# Patient Record
Sex: FEMALE | Race: BLACK OR AFRICAN AMERICAN | Employment: FULL TIME | URBAN - METROPOLITAN AREA
[De-identification: names, ages, dates, MRNs, and addresses within clinical notes are randomized per-mention and may not be internally consistent; named-entity substitution may affect disease eponyms.]

---

## 2017-01-05 ENCOUNTER — LAB REQUISITION (OUTPATIENT)
Dept: LAB | Facility: HOSPITAL | Age: 47
End: 2017-01-05
Payer: COMMERCIAL

## 2017-01-05 DIAGNOSIS — Z11.51 ENCOUNTER FOR SCREENING FOR HUMAN PAPILLOMAVIRUS (HPV): ICD-10-CM

## 2017-01-05 DIAGNOSIS — Z01.419 ENCOUNTER FOR GYNECOLOGICAL EXAMINATION WITHOUT ABNORMAL FINDING: ICD-10-CM

## 2017-01-05 PROCEDURE — 87624 HPV HI-RISK TYP POOLED RSLT: CPT | Performed by: OBSTETRICS & GYNECOLOGY

## 2017-01-05 PROCEDURE — 88175 CYTOPATH C/V AUTO FLUID REDO: CPT | Performed by: OBSTETRICS & GYNECOLOGY

## 2017-01-06 LAB — HPV I/H RISK 1 DNA SPEC QL NAA+PROBE: NEGATIVE

## 2017-01-30 ENCOUNTER — OFFICE VISIT (OUTPATIENT)
Dept: FAMILY MEDICINE CLINIC | Facility: CLINIC | Age: 47
End: 2017-01-30

## 2017-01-30 VITALS
SYSTOLIC BLOOD PRESSURE: 130 MMHG | BODY MASS INDEX: 24.33 KG/M2 | TEMPERATURE: 98 F | WEIGHT: 155 LBS | HEART RATE: 89 BPM | HEIGHT: 67 IN | DIASTOLIC BLOOD PRESSURE: 82 MMHG | OXYGEN SATURATION: 98 %

## 2017-01-30 DIAGNOSIS — R68.89 FLU-LIKE SYMPTOMS: Primary | ICD-10-CM

## 2017-01-30 PROCEDURE — 99213 OFFICE O/P EST LOW 20 MIN: CPT | Performed by: FAMILY MEDICINE

## 2017-01-30 RX ORDER — CLINDAMYCIN HYDROCHLORIDE 300 MG/1
CAPSULE ORAL
Refills: 1 | COMMUNITY
Start: 2016-12-08 | End: 2017-01-30 | Stop reason: ALTCHOICE

## 2017-01-30 RX ORDER — CLINDAMYCIN HYDROCHLORIDE 150 MG/1
CAPSULE ORAL
Refills: 0 | COMMUNITY
Start: 2016-12-13 | End: 2016-12-18

## 2017-01-30 NOTE — PATIENT INSTRUCTIONS
Most viral illnesses get worse for the first 3-5 days, then plateau and improve gradually over the next 3-5 days. Monitor symptoms for now. Use otc meds for comfort as needed. Consider eucalytpus oil to chest and feet at bedtime.   If no better in 3-4

## 2017-01-31 NOTE — PROGRESS NOTES
Patient presents with:  Cough/URI: cold sx for 3 days. temp up to 102.1, now down to 99.        SUBJECTIVE:   Prasad Flores is a 48year old female who presents complaining of flu-like symptoms of fever to 102.1, malaise, fatigue, chills, myalgias, conges Heart Attack Maternal Grandmother      cause of death   • Heart Disorder Neg      sudden cardiac death   • Glaucoma Neg    • Diabetes Neg    • Macular degeneration Neg         Smoking Status: Never Smoker                      Smokeless Status: Never Used beyond 10 days consider starting antibiotics. patient verbalized understanding of the above and agrees to plan.

## 2017-02-22 ENCOUNTER — OFFICE VISIT (OUTPATIENT)
Dept: INTERNAL MEDICINE CLINIC | Facility: CLINIC | Age: 47
End: 2017-02-22

## 2017-02-22 VITALS
HEART RATE: 84 BPM | DIASTOLIC BLOOD PRESSURE: 90 MMHG | BODY MASS INDEX: 24.64 KG/M2 | WEIGHT: 157 LBS | SYSTOLIC BLOOD PRESSURE: 120 MMHG | OXYGEN SATURATION: 99 % | HEIGHT: 67 IN | TEMPERATURE: 98 F

## 2017-02-22 DIAGNOSIS — Z00.00 ANNUAL PHYSICAL EXAM: ICD-10-CM

## 2017-02-22 DIAGNOSIS — D35.2 PITUITARY ADENOMA (HCC): ICD-10-CM

## 2017-02-22 DIAGNOSIS — H54.7 VISION LOSS: Primary | ICD-10-CM

## 2017-02-22 DIAGNOSIS — R09.81 NASAL CONGESTION: ICD-10-CM

## 2017-02-22 PROCEDURE — 99396 PREV VISIT EST AGE 40-64: CPT | Performed by: INTERNAL MEDICINE

## 2017-02-22 RX ORDER — FLUTICASONE PROPIONATE 50 MCG
2 SPRAY, SUSPENSION (ML) NASAL DAILY
Qty: 3 BOTTLE | Refills: 3 | Status: SHIPPED | OUTPATIENT
Start: 2017-02-22 | End: 2017-09-25
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
## 2017-02-27 ENCOUNTER — TELEPHONE (OUTPATIENT)
Dept: OPHTHALMOLOGY | Facility: CLINIC | Age: 47
End: 2017-02-27

## 2017-02-27 NOTE — TELEPHONE ENCOUNTER
Patient has history of Pituitary tumor and has not been seen by Dr. Forrest Dela Cruz since October 2014. Scheduled patient for 3/7/17 at 7:30 for a 30-2 visual field, OCT and dilated EE.kp  Her neurologist wants her to follow up asap.

## 2017-02-27 NOTE — TELEPHONE ENCOUNTER
Patient would like to be seen sooner than next available due to vision problems. Please advise.  Thank you

## 2017-03-04 ENCOUNTER — HOSPITAL ENCOUNTER (OUTPATIENT)
Dept: MAMMOGRAPHY | Age: 47
Discharge: HOME | End: 2017-03-04
Attending: OBSTETRICS & GYNECOLOGY
Payer: COMMERCIAL

## 2017-03-04 DIAGNOSIS — Z12.31 ENCOUNTER FOR SCREENING MAMMOGRAM FOR BREAST CANCER: ICD-10-CM

## 2017-03-04 PROCEDURE — 77067 SCR MAMMO BI INCL CAD: CPT

## 2017-03-04 PROCEDURE — 77063 BREAST TOMOSYNTHESIS BI: CPT

## 2017-03-07 ENCOUNTER — LAB REQUISITION (OUTPATIENT)
Dept: LAB | Facility: HOSPITAL | Age: 47
End: 2017-03-07
Payer: COMMERCIAL

## 2017-03-07 ENCOUNTER — OFFICE VISIT (OUTPATIENT)
Dept: OPHTHALMOLOGY | Facility: CLINIC | Age: 47
End: 2017-03-07

## 2017-03-07 DIAGNOSIS — D35.2 PITUITARY ADENOMA (HCC): ICD-10-CM

## 2017-03-07 DIAGNOSIS — H46.9 OPTIC NEUROPATHY, LEFT: ICD-10-CM

## 2017-03-07 DIAGNOSIS — N39.0 URINARY TRACT INFECTION: ICD-10-CM

## 2017-03-07 DIAGNOSIS — H40.003 GLAUCOMA SUSPECT, BILATERAL: Primary | ICD-10-CM

## 2017-03-07 PROCEDURE — 92250 FUNDUS PHOTOGRAPHY W/I&R: CPT | Performed by: OPHTHALMOLOGY

## 2017-03-07 PROCEDURE — 87086 URINE CULTURE/COLONY COUNT: CPT | Performed by: OBSTETRICS & GYNECOLOGY

## 2017-03-07 PROCEDURE — 92083 EXTENDED VISUAL FIELD XM: CPT | Performed by: OPHTHALMOLOGY

## 2017-03-07 PROCEDURE — 87077 CULTURE AEROBIC IDENTIFY: CPT | Performed by: OBSTETRICS & GYNECOLOGY

## 2017-03-07 PROCEDURE — 92133 CPTRZD OPH DX IMG PST SGM ON: CPT | Performed by: OPHTHALMOLOGY

## 2017-03-07 PROCEDURE — 87186 SC STD MICRODIL/AGAR DIL: CPT | Performed by: OBSTETRICS & GYNECOLOGY

## 2017-03-07 PROCEDURE — 99244 OFF/OP CNSLTJ NEW/EST MOD 40: CPT | Performed by: OPHTHALMOLOGY

## 2017-03-07 PROCEDURE — 99213 OFFICE O/P EST LOW 20 MIN: CPT | Performed by: OPHTHALMOLOGY

## 2017-03-07 NOTE — PATIENT INSTRUCTIONS
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   Re

## 2017-03-07 NOTE — ASSESSMENT & PLAN NOTE
Patient is under the care of Dr. Moses Ross and Dr. Zuleika Sherman. She has not had an MRI since 2014. Recommend repeat MRI in the near future. Visual field completed with stable results that were discussed with patient in office.     Stressed importance of mansoor

## 2017-03-07 NOTE — ASSESSMENT & PLAN NOTE
Discussed with patient that she is a glaucoma suspect based on increased cupping of the optic nerve in the left eye. Retinal photos taken today to document optic nerves.    Visual field and OCT completed in office today with stable results that were discus

## 2017-03-07 NOTE — PROGRESS NOTES
Jitendra Lomeli is a 48year old female. HPI:     HPI     Consult    Additional comments: Patient is referred by Dr. Ivet Pandya   Patient is here for a VF 30-2, OCT and an eye exam.  She has a hx of a pituitary adenoma, dx in 2010.  She sees Allergies:    Amoxicillin             Hives  Penicillins             Hives    ROS:     ROS     Positive for: Neurological, Eyes    Negative for: Constitutional, Gastrointestinal, Skin, Genitourinary, Musculoskeletal, HENT, Endocrine, Cardiovascular, the optic nerve in the left eye. Retinal photos taken today to document optic nerves. Visual field and OCT completed in office today with stable results that were discussed with patient in office today.     Visual field in the right eye was normal and le

## 2017-03-09 ENCOUNTER — TELEPHONE (OUTPATIENT)
Dept: INTERNAL MEDICINE CLINIC | Facility: CLINIC | Age: 47
End: 2017-03-09

## 2017-03-09 DIAGNOSIS — M25.551 RIGHT HIP PAIN: ICD-10-CM

## 2017-03-09 DIAGNOSIS — Z96.642 HISTORY OF TOTAL HIP REPLACEMENT, LEFT: Primary | ICD-10-CM

## 2017-03-09 NOTE — TELEPHONE ENCOUNTER
LMOVM requesting call back for reason patient being seen. Has had THR with Dr. Von Singh in the past per Dr. Moore Ground note.  Need dx for new referral.

## 2017-03-10 NOTE — TELEPHONE ENCOUNTER
Pt states she is seeing Dr. Darrian Loya for one year f/u of L THR and also for new R hip pain that she has been having    To Dr. Analia Bull, ok to order referral?

## 2017-03-11 ENCOUNTER — HOSPITAL ENCOUNTER (OUTPATIENT)
Dept: MRI IMAGING | Facility: HOSPITAL | Age: 47
Discharge: HOME | End: 2017-03-11
Attending: INTERNAL MEDICINE
Payer: COMMERCIAL

## 2017-03-11 DIAGNOSIS — D35.3 BENIGN NEOPLASM OF PITUITARY GLAND AND CRANIOPHARYNGEAL DUCT (POUCH) (HCC): ICD-10-CM

## 2017-03-11 DIAGNOSIS — D35.2 BENIGN NEOPLASM OF PITUITARY GLAND AND CRANIOPHARYNGEAL DUCT (POUCH) (HCC): ICD-10-CM

## 2017-03-11 DIAGNOSIS — E22.1 FAMILIAL ISOLATED PROLACTIN SECRETING PITUITARY ADENOMA (HCC): ICD-10-CM

## 2017-03-11 DIAGNOSIS — D35.2 FAMILIAL ISOLATED PROLACTIN SECRETING PITUITARY ADENOMA (HCC): ICD-10-CM

## 2017-03-11 PROCEDURE — 70553 MRI BRAIN STEM W/O & W/DYE: CPT

## 2017-03-11 PROCEDURE — A9575 INJ GADOTERATE MEGLUMI 0.1ML: HCPCS | Performed by: INTERNAL MEDICINE

## 2017-03-13 ENCOUNTER — TELEPHONE (OUTPATIENT)
Dept: OPHTHALMOLOGY | Facility: CLINIC | Age: 47
End: 2017-03-13

## 2017-03-13 NOTE — TELEPHONE ENCOUNTER
pt called. LOV 3/7/17 with MICHELLE. She is asking for RX for glasses to be mail to her. Mailing address is verified. Thank You.

## 2017-03-20 ENCOUNTER — TELEPHONE (OUTPATIENT)
Dept: OPHTHALMOLOGY | Facility: CLINIC | Age: 47
End: 2017-03-20

## 2017-03-20 NOTE — TELEPHONE ENCOUNTER
Patient would like to double check and see if Rx mailed on 03/13 to home address is the current prescription. States it has a date of 2014 on it. Please advise.  Thank you

## 2017-03-21 ENCOUNTER — OPTICAL REFILL REQUEST (OUTPATIENT)
Dept: OPHTHALMOLOGY | Facility: CLINIC | Age: 47
End: 2017-03-21

## 2017-03-21 ENCOUNTER — TELEPHONE (OUTPATIENT)
Dept: OPHTHALMOLOGY | Facility: CLINIC | Age: 47
End: 2017-03-21

## 2017-03-21 NOTE — TELEPHONE ENCOUNTER
Spoke with patient. She states that the reading Rx she received was from 2014. I told patient that was the last refraction that she had.  I also told patient that I will reprint her reading glasses Rx with today's date and mail it to her/nw

## 2017-04-25 ENCOUNTER — TELEPHONE (OUTPATIENT)
Dept: INTERNAL MEDICINE CLINIC | Facility: CLINIC | Age: 47
End: 2017-04-25

## 2017-04-25 NOTE — TELEPHONE ENCOUNTER
[phone]  Both eyes are puffy underneath. Started this morning when she woke up.  No other symptoms  To clinical

## 2017-04-25 NOTE — TELEPHONE ENCOUNTER
Patient called back, states just this morning when she woke up she noticed the puffiness underneath both eyes, patient states that left is worse than right and she has never had this before.  Patient denies any allergies, states she was at [facility] over

## 2017-05-04 ENCOUNTER — TELEPHONE (OUTPATIENT)
Dept: INTERNAL MEDICINE CLINIC | Facility: CLINIC | Age: 47
End: 2017-05-04

## 2017-05-04 DIAGNOSIS — Z00.00 ANNUAL PHYSICAL EXAM: Primary | ICD-10-CM

## 2017-05-04 DIAGNOSIS — I10 ESSENTIAL HYPERTENSION: ICD-10-CM

## 2017-05-04 DIAGNOSIS — D35.2 PITUITARY ADENOMA (HCC): ICD-10-CM

## 2017-05-04 DIAGNOSIS — D35.2 PITUITARY ADENOMA (HCC): Primary | ICD-10-CM

## 2017-05-04 NOTE — TELEPHONE ENCOUNTER
Dr. Trisha Navarro notes in her 2/22/17 OV note that patient will be seeing Dr. Anay Rob for pituitary adenoma. Referral entered. Patient notified of auto-authorized referral for 3 visits. She verbalized understanding.

## 2017-05-04 NOTE — TELEPHONE ENCOUNTER
Please call pt, requesting order for labs  Pt hoping to go Saturday to [facility]  Please confirm that order in place at [phone]  Tasked to nursing

## 2017-05-04 NOTE — TELEPHONE ENCOUNTER
To Dr. Meme Carvajal. Please advise on which labs you would like. Active orders for Lipid Panel and CMP in system.

## 2017-05-06 ENCOUNTER — LAB ENCOUNTER (OUTPATIENT)
Dept: LAB | Facility: HOSPITAL | Age: 47
End: 2017-05-06
Attending: INTERNAL MEDICINE
Payer: COMMERCIAL

## 2017-05-06 DIAGNOSIS — I10 ESSENTIAL HYPERTENSION: ICD-10-CM

## 2017-05-06 DIAGNOSIS — D35.2 PITUITARY ADENOMA (HCC): ICD-10-CM

## 2017-05-06 PROCEDURE — 80053 COMPREHEN METABOLIC PANEL: CPT

## 2017-05-06 PROCEDURE — 84443 ASSAY THYROID STIM HORMONE: CPT

## 2017-05-06 PROCEDURE — 85025 COMPLETE CBC W/AUTO DIFF WBC: CPT

## 2017-05-06 PROCEDURE — 84146 ASSAY OF PROLACTIN: CPT

## 2017-05-06 PROCEDURE — 36415 COLL VENOUS BLD VENIPUNCTURE: CPT

## 2017-05-06 PROCEDURE — 80061 LIPID PANEL: CPT

## 2017-05-08 ENCOUNTER — TELEPHONE (OUTPATIENT)
Dept: INTERNAL MEDICINE CLINIC | Facility: CLINIC | Age: 47
End: 2017-05-08

## 2017-05-08 DIAGNOSIS — D69.6 THROMBOCYTOPENIA (HCC): Primary | ICD-10-CM

## 2017-05-08 NOTE — TELEPHONE ENCOUNTER
Please notify patient that her labs look good. 2 of her blood counts were very slightly low, and I would like her to repeat one blood test in 1 month. She does not need to fast for this, order is in the system.      I am forwarding these tests to Dr. Francisco Javier Gomez

## 2017-09-25 ENCOUNTER — OFFICE VISIT (OUTPATIENT)
Dept: INTERNAL MEDICINE CLINIC | Facility: CLINIC | Age: 47
End: 2017-09-25

## 2017-09-25 ENCOUNTER — LAB ENCOUNTER (OUTPATIENT)
Dept: LAB | Facility: HOSPITAL | Age: 47
End: 2017-09-25
Attending: INTERNAL MEDICINE
Payer: COMMERCIAL

## 2017-09-25 VITALS
HEIGHT: 67 IN | HEART RATE: 76 BPM | OXYGEN SATURATION: 99 % | WEIGHT: 158 LBS | BODY MASS INDEX: 24.8 KG/M2 | DIASTOLIC BLOOD PRESSURE: 92 MMHG | TEMPERATURE: 99 F | SYSTOLIC BLOOD PRESSURE: 132 MMHG

## 2017-09-25 DIAGNOSIS — H54.7 VISION LOSS: ICD-10-CM

## 2017-09-25 DIAGNOSIS — D69.6 THROMBOCYTOPENIA (HCC): ICD-10-CM

## 2017-09-25 DIAGNOSIS — IMO0002 ANTIBIOTIC PROPHYLAXIS FOR DENTAL PROCEDURE INDICATED DUE TO PRIOR JOINT REPLACEMENT: ICD-10-CM

## 2017-09-25 DIAGNOSIS — G44.229 CHRONIC TENSION-TYPE HEADACHE, NOT INTRACTABLE: ICD-10-CM

## 2017-09-25 DIAGNOSIS — B07.0 PLANTAR WART: Primary | ICD-10-CM

## 2017-09-25 LAB
BASOPHILS # BLD: 0 K/UL (ref 0–0.2)
BASOPHILS NFR BLD: 1 %
EOSINOPHIL # BLD: 0.2 K/UL (ref 0–0.7)
EOSINOPHIL NFR BLD: 3 %
ERYTHROCYTE [DISTWIDTH] IN BLOOD BY AUTOMATED COUNT: 13.1 % (ref 11–15)
HCT VFR BLD AUTO: 36.3 % (ref 35–48)
HGB BLD-MCNC: 12.2 G/DL (ref 12–16)
LYMPHOCYTES # BLD: 2 K/UL (ref 1–4)
LYMPHOCYTES NFR BLD: 34 %
MCH RBC QN AUTO: 32.7 PG (ref 27–32)
MCHC RBC AUTO-ENTMCNC: 33.5 G/DL (ref 32–37)
MCV RBC AUTO: 97.5 FL (ref 80–100)
MONOCYTES # BLD: 0.4 K/UL (ref 0–1)
MONOCYTES NFR BLD: 6 %
NEUTROPHILS # BLD AUTO: 3.4 K/UL (ref 1.8–7.7)
NEUTROPHILS NFR BLD: 57 %
PLATELET # BLD AUTO: 151 K/UL (ref 140–400)
PMV BLD AUTO: 9.3 FL (ref 7.4–10.3)
RBC # BLD AUTO: 3.73 M/UL (ref 3.7–5.4)
WBC # BLD AUTO: 6 K/UL (ref 4–11)

## 2017-09-25 PROCEDURE — 36415 COLL VENOUS BLD VENIPUNCTURE: CPT

## 2017-09-25 PROCEDURE — 85025 COMPLETE CBC W/AUTO DIFF WBC: CPT

## 2017-09-25 PROCEDURE — 99214 OFFICE O/P EST MOD 30 MIN: CPT | Performed by: INTERNAL MEDICINE

## 2017-09-25 PROCEDURE — 99212 OFFICE O/P EST SF 10 MIN: CPT | Performed by: INTERNAL MEDICINE

## 2017-09-25 RX ORDER — CLINDAMYCIN HYDROCHLORIDE 300 MG/1
CAPSULE ORAL
Qty: 2 CAPSULE | Refills: 0 | Status: SHIPPED | OUTPATIENT
Start: 2017-09-25 | End: 2018-01-15

## 2017-09-25 NOTE — PROGRESS NOTES
Opal Marino is a 50year old female. Patient presents with: Infection: States had a hang nail on right thumb for a few months now. Using peroxide and abx oint. Denies redness or drainage. Derm Problem: painful wart under right foot.   Follow - Up: Had SURGERY PROC UNLISTED      Comment: per NG: right ankle sx  2013: OOPHORECTOMY      Comment: per HANS: fallopians removed from uterine area  No date: PARATHYROIDECTOMY      Comment: 2010, [facility]  2012: PRIOR MYOMECTOMY   Family History   Problem Relation Age

## 2017-09-26 ENCOUNTER — TELEPHONE (OUTPATIENT)
Dept: INTERNAL MEDICINE CLINIC | Facility: CLINIC | Age: 47
End: 2017-09-26

## 2018-01-04 ENCOUNTER — OFFICE VISIT (OUTPATIENT)
Dept: OPHTHALMOLOGY | Facility: CLINIC | Age: 48
End: 2018-01-04

## 2018-01-04 ENCOUNTER — LAB ENCOUNTER (OUTPATIENT)
Dept: LAB | Facility: HOSPITAL | Age: 48
End: 2018-01-04
Attending: INTERNAL MEDICINE
Payer: COMMERCIAL

## 2018-01-04 DIAGNOSIS — H40.003 GLAUCOMA SUSPECT OF BOTH EYES: Primary | ICD-10-CM

## 2018-01-04 DIAGNOSIS — E22.1: Primary | ICD-10-CM

## 2018-01-04 DIAGNOSIS — H46.9 OPTIC NEUROPATHY: ICD-10-CM

## 2018-01-04 DIAGNOSIS — D35.2 PITUITARY ADENOMA (HCC): ICD-10-CM

## 2018-01-04 LAB — PROLACTIN SERPL-MCNC: 24.2 NG/ML (ref 1.4–24.2)

## 2018-01-04 PROCEDURE — 99243 OFF/OP CNSLTJ NEW/EST LOW 30: CPT | Performed by: OPHTHALMOLOGY

## 2018-01-04 PROCEDURE — 36415 COLL VENOUS BLD VENIPUNCTURE: CPT

## 2018-01-04 PROCEDURE — 99212 OFFICE O/P EST SF 10 MIN: CPT | Performed by: OPHTHALMOLOGY

## 2018-01-04 PROCEDURE — 84146 ASSAY OF PROLACTIN: CPT
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
## 2018-01-04 NOTE — PROGRESS NOTES
Chapis Christopher is a 50year old female. HPI:     HPI     Consult    Additional comments: Consult per Dr. Alana Mariee           Comments   Patient is here for a 1 year dilated exam.  She has a hx of a pituitary adenoma, dx in 2010. She sees Dr. Doron Zarco yearly. Prescriptions:  Clindamycin HCl 300 MG Oral Cap Take 2 tablets 30-60 minutes prior to dental procedure Disp: 2 capsule Rfl: 0   Cabergoline 0.5 MG Oral Tab Take 0.5 tablets by mouth once a week.  Disp:  Rfl: 1   Multiple Vitamins-Minerals ([place] Screen +0.00 000    Type:  Reading only          Manifest Refraction     Patient declines a refraction.  She is happy just wearing glasses for reading only                 ASSESSMENT/PLAN:     Diagnoses and Plan:     Glaucoma suspect  Discussed with patient that s

## 2018-01-15 ENCOUNTER — OFFICE VISIT (OUTPATIENT)
Dept: INTERNAL MEDICINE CLINIC | Facility: CLINIC | Age: 48
End: 2018-01-15

## 2018-01-15 VITALS
SYSTOLIC BLOOD PRESSURE: 112 MMHG | WEIGHT: 156 LBS | BODY MASS INDEX: 25.07 KG/M2 | HEIGHT: 66 IN | DIASTOLIC BLOOD PRESSURE: 78 MMHG | TEMPERATURE: 98 F | HEART RATE: 80 BPM

## 2018-01-15 DIAGNOSIS — R10.31 RIGHT GROIN PAIN: Primary | ICD-10-CM

## 2018-01-15 PROCEDURE — 99212 OFFICE O/P EST SF 10 MIN: CPT | Performed by: INTERNAL MEDICINE

## 2018-01-15 PROCEDURE — 99213 OFFICE O/P EST LOW 20 MIN: CPT | Performed by: INTERNAL MEDICINE

## 2018-01-15 NOTE — PROGRESS NOTES
Viraj Richey is a 50year old female. Patient presents with:  Back Pain: radiates from back to front on right side in kidney area      HPI:   Viraj Richey is a 50year old female who presents for: back pain    Last Sunday, walked in the house, tripped Comment: per NG: fallopians removed from uterine area  No date: PARATHYROIDECTOMY      Comment: 2010, [facility]  2012: PRIOR MYOMECTOMY   Family History   Problem Relation Age of Onset   • Heart Attack Maternal Grandmother 80     cause of death   • Cancer Fat

## 2018-04-03 ENCOUNTER — TELEPHONE (OUTPATIENT)
Dept: INTERNAL MEDICINE CLINIC | Facility: CLINIC | Age: 48
End: 2018-04-03

## 2018-04-03 DIAGNOSIS — M79.604 PAIN IN RIGHT LEG: ICD-10-CM

## 2018-04-03 DIAGNOSIS — Z98.890 HISTORY OF HIP SURGERY: Primary | ICD-10-CM

## 2018-04-03 NOTE — TELEPHONE ENCOUNTER
Pt requesting referral for Dr Sarah Anne, pt is scheduled 4/25,    Referral needed for follow up to hip surgery & pain in right leg, pt was told she will need 2 referrals.   Tasked to nursing

## 2018-04-04 NOTE — TELEPHONE ENCOUNTER
Called patient and notified her that referral was ordered. Notified her that status shows as \"open\" so managed care department will need to work with her insurance to get the referral approved. She verbalized understanding.

## 2018-04-18 ENCOUNTER — OFFICE VISIT (OUTPATIENT)
Dept: INTERNAL MEDICINE CLINIC | Facility: CLINIC | Age: 48
End: 2018-04-18

## 2018-04-18 ENCOUNTER — LAB ENCOUNTER (OUTPATIENT)
Dept: LAB | Age: 48
End: 2018-04-18
Attending: INTERNAL MEDICINE
Payer: COMMERCIAL

## 2018-04-18 VITALS
OXYGEN SATURATION: 98 % | BODY MASS INDEX: 24.62 KG/M2 | HEIGHT: 66 IN | SYSTOLIC BLOOD PRESSURE: 130 MMHG | DIASTOLIC BLOOD PRESSURE: 88 MMHG | HEART RATE: 142 BPM | TEMPERATURE: 102 F | WEIGHT: 153.19 LBS

## 2018-04-18 DIAGNOSIS — R68.89 RIGORS: ICD-10-CM

## 2018-04-18 DIAGNOSIS — B34.9 VIRAL SYNDROME: ICD-10-CM

## 2018-04-18 DIAGNOSIS — R50.9 FEVER, UNSPECIFIED FEVER CAUSE: Primary | ICD-10-CM

## 2018-04-18 DIAGNOSIS — R50.9 FEVER, UNSPECIFIED FEVER CAUSE: ICD-10-CM

## 2018-04-18 PROCEDURE — 85025 COMPLETE CBC W/AUTO DIFF WBC: CPT

## 2018-04-18 PROCEDURE — 87086 URINE CULTURE/COLONY COUNT: CPT

## 2018-04-18 PROCEDURE — 87040 BLOOD CULTURE FOR BACTERIA: CPT

## 2018-04-18 PROCEDURE — 36415 COLL VENOUS BLD VENIPUNCTURE: CPT

## 2018-04-18 PROCEDURE — 99212 OFFICE O/P EST SF 10 MIN: CPT | Performed by: INTERNAL MEDICINE

## 2018-04-18 PROCEDURE — 80053 COMPREHEN METABOLIC PANEL: CPT

## 2018-04-18 PROCEDURE — 87077 CULTURE AEROBIC IDENTIFY: CPT

## 2018-04-18 PROCEDURE — 99213 OFFICE O/P EST LOW 20 MIN: CPT | Performed by: INTERNAL MEDICINE

## 2018-04-18 PROCEDURE — 81001 URINALYSIS AUTO W/SCOPE: CPT

## 2018-04-18 PROCEDURE — 87186 SC STD MICRODIL/AGAR DIL: CPT

## 2018-04-18 RX ORDER — OSELTAMIVIR PHOSPHATE 75 MG/1
75 CAPSULE ORAL 2 TIMES DAILY
Qty: 10 CAPSULE | Refills: 0 | Status: ON HOLD | OUTPATIENT
Start: 2018-04-18 | End: 2018-04-20

## 2018-04-18 NOTE — PROGRESS NOTES
Shannan Larios is a 46year old female. Patient presents with:  Fever: 101 temp today. c/o chills, fever, N/V (x2), right lower back pain, lethargic. Denies any urinary sx. no cold of flu sx. just got over a stomach bug 2 weeks ago.     HPI:     Returned fr Encounters:  04/18/18 : 153 lb 3.2 oz (69.5 kg)  01/15/18 : 156 lb (70.8 kg)  09/25/17 : 158 lb (71.7 kg)  02/22/17 : 157 lb (71.2 kg)  01/30/17 : 155 lb (70.3 kg)    Body mass index is 24.73 kg/m².       EXAM:   /88 (BP Location: Left arm, Patient Po

## 2018-04-19 ENCOUNTER — ANESTHESIA (OUTPATIENT)
Dept: SURGERY | Facility: HOSPITAL | Age: 48
DRG: 872 | End: 2018-04-19
Payer: COMMERCIAL

## 2018-04-19 ENCOUNTER — SURGERY (OUTPATIENT)
Age: 48
End: 2018-04-19

## 2018-04-19 ENCOUNTER — APPOINTMENT (OUTPATIENT)
Dept: GENERAL RADIOLOGY | Facility: HOSPITAL | Age: 48
DRG: 872 | End: 2018-04-19
Attending: UROLOGY
Payer: COMMERCIAL

## 2018-04-19 ENCOUNTER — TELEPHONE (OUTPATIENT)
Dept: INTERNAL MEDICINE CLINIC | Facility: CLINIC | Age: 48
End: 2018-04-19

## 2018-04-19 ENCOUNTER — APPOINTMENT (OUTPATIENT)
Dept: CT IMAGING | Facility: HOSPITAL | Age: 48
DRG: 872 | End: 2018-04-19
Attending: EMERGENCY MEDICINE
Payer: COMMERCIAL

## 2018-04-19 ENCOUNTER — ANESTHESIA EVENT (OUTPATIENT)
Dept: SURGERY | Facility: HOSPITAL | Age: 48
DRG: 872 | End: 2018-04-19
Payer: COMMERCIAL

## 2018-04-19 ENCOUNTER — HOSPITAL ENCOUNTER (INPATIENT)
Facility: HOSPITAL | Age: 48
LOS: 2 days | Discharge: HOME | DRG: 872 | End: 2018-04-21
Attending: EMERGENCY MEDICINE | Admitting: HOSPITALIST
Payer: COMMERCIAL

## 2018-04-19 DIAGNOSIS — N39.0 URINARY TRACT INFECTION WITHOUT HEMATURIA, SITE UNSPECIFIED: ICD-10-CM

## 2018-04-19 DIAGNOSIS — N20.0 KIDNEY STONE: Primary | ICD-10-CM

## 2018-04-19 PROCEDURE — 74177 CT ABD & PELVIS W/CONTRAST: CPT | Performed by: EMERGENCY MEDICINE

## 2018-04-19 PROCEDURE — 74420 UROGRAPHY RTRGR +-KUB: CPT | Performed by: UROLOGY

## 2018-04-19 PROCEDURE — 0T768DZ DILATION OF RIGHT URETER WITH INTRALUMINAL DEVICE, VIA NATURAL OR ARTIFICIAL OPENING ENDOSCOPIC: ICD-10-PCS | Performed by: UROLOGY

## 2018-04-19 PROCEDURE — BT1DYZZ FLUOROSCOPY OF RIGHT KIDNEY, URETER AND BLADDER USING OTHER CONTRAST: ICD-10-PCS | Performed by: UROLOGY

## 2018-04-19 DEVICE — STENT URET 6F 24CM ULSMTH: Type: IMPLANTABLE DEVICE | Site: URETER | Status: FUNCTIONAL

## 2018-04-19 RX ORDER — MEPERIDINE HYDROCHLORIDE 25 MG/ML
12.5 INJECTION INTRAMUSCULAR; INTRAVENOUS; SUBCUTANEOUS AS NEEDED
Status: DISCONTINUED | OUTPATIENT
Start: 2018-04-19 | End: 2018-04-19 | Stop reason: HOSPADM

## 2018-04-19 RX ORDER — ACETAMINOPHEN 500 MG
500 TABLET ORAL EVERY 6 HOURS PRN
COMMUNITY
End: 2018-05-24

## 2018-04-19 RX ORDER — HYDROCODONE BITARTRATE AND ACETAMINOPHEN 5; 325 MG/1; MG/1
2 TABLET ORAL AS NEEDED
Status: DISCONTINUED | OUTPATIENT
Start: 2018-04-19 | End: 2018-04-19 | Stop reason: HOSPADM

## 2018-04-19 RX ORDER — SODIUM CHLORIDE, SODIUM LACTATE, POTASSIUM CHLORIDE, CALCIUM CHLORIDE 600; 310; 30; 20 MG/100ML; MG/100ML; MG/100ML; MG/100ML
INJECTION, SOLUTION INTRAVENOUS CONTINUOUS
Status: DISCONTINUED | OUTPATIENT
Start: 2018-04-19 | End: 2018-04-19 | Stop reason: HOSPADM

## 2018-04-19 RX ORDER — SODIUM CHLORIDE 9 MG/ML
INJECTION, SOLUTION INTRAVENOUS CONTINUOUS
Status: CANCELLED | OUTPATIENT
Start: 2018-04-19 | End: 2018-04-19

## 2018-04-19 RX ORDER — LEVOFLOXACIN 5 MG/ML
500 INJECTION, SOLUTION INTRAVENOUS ONCE
Status: COMPLETED | OUTPATIENT
Start: 2018-04-19 | End: 2018-04-19

## 2018-04-19 RX ORDER — MIDAZOLAM HYDROCHLORIDE 1 MG/ML
1 INJECTION INTRAMUSCULAR; INTRAVENOUS EVERY 5 MIN PRN
Status: DISCONTINUED | OUTPATIENT
Start: 2018-04-19 | End: 2018-04-19 | Stop reason: HOSPADM

## 2018-04-19 RX ORDER — NALOXONE HYDROCHLORIDE 0.4 MG/ML
80 INJECTION, SOLUTION INTRAMUSCULAR; INTRAVENOUS; SUBCUTANEOUS AS NEEDED
Status: DISCONTINUED | OUTPATIENT
Start: 2018-04-19 | End: 2018-04-19 | Stop reason: HOSPADM

## 2018-04-19 RX ORDER — HYDROCODONE BITARTRATE AND ACETAMINOPHEN 5; 325 MG/1; MG/1
1 TABLET ORAL AS NEEDED
Status: DISCONTINUED | OUTPATIENT
Start: 2018-04-19 | End: 2018-04-19 | Stop reason: HOSPADM

## 2018-04-19 RX ORDER — DIPHENHYDRAMINE HYDROCHLORIDE 50 MG/ML
12.5 INJECTION INTRAMUSCULAR; INTRAVENOUS AS NEEDED
Status: DISCONTINUED | OUTPATIENT
Start: 2018-04-19 | End: 2018-04-19 | Stop reason: HOSPADM

## 2018-04-19 RX ORDER — ACETAMINOPHEN 500 MG
1000 TABLET ORAL ONCE
Status: COMPLETED | OUTPATIENT
Start: 2018-04-19 | End: 2018-04-19

## 2018-04-19 RX ORDER — LEVOFLOXACIN 5 MG/ML
750 INJECTION, SOLUTION INTRAVENOUS ONCE
Status: DISCONTINUED | OUTPATIENT
Start: 2018-04-19 | End: 2018-04-19

## 2018-04-19 RX ORDER — LEVOFLOXACIN 500 MG/1
500 TABLET, FILM COATED ORAL DAILY
Qty: 7 TABLET | Refills: 0 | Status: ON HOLD | OUTPATIENT
Start: 2018-04-19 | End: 2018-04-21

## 2018-04-19 RX ORDER — ONDANSETRON 2 MG/ML
4 INJECTION INTRAMUSCULAR; INTRAVENOUS AS NEEDED
Status: DISCONTINUED | OUTPATIENT
Start: 2018-04-19 | End: 2018-04-19 | Stop reason: HOSPADM

## 2018-04-19 RX ORDER — ACETAMINOPHEN 500 MG
1000 TABLET ORAL ONCE AS NEEDED
Status: DISCONTINUED | OUTPATIENT
Start: 2018-04-19 | End: 2018-04-19 | Stop reason: HOSPADM

## 2018-04-19 RX ORDER — SODIUM CHLORIDE, SODIUM LACTATE, POTASSIUM CHLORIDE, CALCIUM CHLORIDE 600; 310; 30; 20 MG/100ML; MG/100ML; MG/100ML; MG/100ML
INJECTION, SOLUTION INTRAVENOUS CONTINUOUS
Status: DISCONTINUED | OUTPATIENT
Start: 2018-04-19 | End: 2018-04-21

## 2018-04-19 NOTE — TELEPHONE ENCOUNTER
Pt is calling her fever 103.7, when she takes tylenol it goes down about 40 min later then about 3 hrs after the tylenol it spikes again  Please call [phone]    Tasked to nursing high

## 2018-04-19 NOTE — TELEPHONE ENCOUNTER
Called patient with Dr. Dimple Smyth message. Sent Levaquin to local pharmacy. Advised patient see Dr. Asmita Zafar next week and can stop tamiflu.

## 2018-04-19 NOTE — TELEPHONE ENCOUNTER
That's a pretty high temp.   I would advise ER -- at least to repeat labs and give her a liter or two of IVF's

## 2018-04-19 NOTE — TELEPHONE ENCOUNTER
Pt's labs are c/w UTI -- blood cx still pending. Start levaquin 500mg qday x 7 days. If fevers persist over the next couple of days, or pt experiences lightheadedness, weakness, she is to call. Push fluids. See Dr. Eleno Eaton next week.

## 2018-04-19 NOTE — TELEPHONE ENCOUNTER
To Dr. Chip Reddy - see below. Pt states levaquin just picked up, will start dosing now. Pt concerned if temp is over 104 - ER? Pt advised to push fluids which she states she is doing.

## 2018-04-20 ENCOUNTER — TELEPHONE (OUTPATIENT)
Dept: INTERNAL MEDICINE CLINIC | Facility: CLINIC | Age: 48
End: 2018-04-20

## 2018-04-20 DIAGNOSIS — N20.0 NEPHROLITHIASIS: Primary | ICD-10-CM

## 2018-04-20 PROBLEM — N20.1 RIGHT URETERAL STONE: Status: ACTIVE | Noted: 2018-04-20

## 2018-04-20 PROCEDURE — 99222 1ST HOSP IP/OBS MODERATE 55: CPT | Performed by: INTERNAL MEDICINE

## 2018-04-20 RX ORDER — ONDANSETRON 4 MG/1
4 TABLET, FILM COATED ORAL EVERY 6 HOURS PRN
Status: DISCONTINUED | OUTPATIENT
Start: 2018-04-20 | End: 2018-04-21

## 2018-04-20 RX ORDER — HYDROCODONE BITARTRATE AND ACETAMINOPHEN 5; 325 MG/1; MG/1
2 TABLET ORAL EVERY 4 HOURS PRN
Status: DISCONTINUED | OUTPATIENT
Start: 2018-04-20 | End: 2018-04-20

## 2018-04-20 RX ORDER — ONDANSETRON 2 MG/ML
4 INJECTION INTRAMUSCULAR; INTRAVENOUS EVERY 6 HOURS PRN
Status: DISCONTINUED | OUTPATIENT
Start: 2018-04-20 | End: 2018-04-20

## 2018-04-20 RX ORDER — POLYETHYLENE GLYCOL 3350 17 G/17G
17 POWDER, FOR SOLUTION ORAL DAILY PRN
Status: DISCONTINUED | OUTPATIENT
Start: 2018-04-20 | End: 2018-04-21

## 2018-04-20 RX ORDER — SODIUM PHOSPHATE, DIBASIC AND SODIUM PHOSPHATE, MONOBASIC 7; 19 G/133ML; G/133ML
1 ENEMA RECTAL ONCE AS NEEDED
Status: DISCONTINUED | OUTPATIENT
Start: 2018-04-20 | End: 2018-04-21

## 2018-04-20 RX ORDER — POTASSIUM CHLORIDE 20 MEQ/1
40 TABLET, EXTENDED RELEASE ORAL ONCE
Status: COMPLETED | OUTPATIENT
Start: 2018-04-20 | End: 2018-04-20

## 2018-04-20 RX ORDER — BISACODYL 10 MG
10 SUPPOSITORY, RECTAL RECTAL
Status: DISCONTINUED | OUTPATIENT
Start: 2018-04-20 | End: 2018-04-21

## 2018-04-20 RX ORDER — ONDANSETRON 2 MG/ML
4 INJECTION INTRAMUSCULAR; INTRAVENOUS EVERY 6 HOURS PRN
Status: DISCONTINUED | OUTPATIENT
Start: 2018-04-20 | End: 2018-04-21

## 2018-04-20 RX ORDER — ACETAMINOPHEN 325 MG/1
650 TABLET ORAL EVERY 4 HOURS PRN
Status: DISCONTINUED | OUTPATIENT
Start: 2018-04-20 | End: 2018-04-21

## 2018-04-20 RX ORDER — PHENAZOPYRIDINE HYDROCHLORIDE 200 MG/1
200 TABLET, FILM COATED ORAL 3 TIMES DAILY PRN
Status: DISCONTINUED | OUTPATIENT
Start: 2018-04-20 | End: 2018-04-21

## 2018-04-20 RX ORDER — ACETAMINOPHEN 325 MG/1
650 TABLET ORAL EVERY 4 HOURS PRN
Status: DISCONTINUED | OUTPATIENT
Start: 2018-04-20 | End: 2018-04-20

## 2018-04-20 RX ORDER — ACETAMINOPHEN 325 MG/1
650 TABLET ORAL EVERY 6 HOURS PRN
Status: DISCONTINUED | OUTPATIENT
Start: 2018-04-20 | End: 2018-04-21

## 2018-04-20 RX ORDER — CIPROFLOXACIN 2 MG/ML
400 INJECTION, SOLUTION INTRAVENOUS EVERY 12 HOURS
Status: DISCONTINUED | OUTPATIENT
Start: 2018-04-20 | End: 2018-04-21

## 2018-04-20 RX ORDER — DOCUSATE SODIUM 100 MG/1
100 CAPSULE, LIQUID FILLED ORAL 2 TIMES DAILY
Status: DISCONTINUED | OUTPATIENT
Start: 2018-04-20 | End: 2018-04-20

## 2018-04-20 RX ORDER — HYDROCODONE BITARTRATE AND ACETAMINOPHEN 5; 325 MG/1; MG/1
1 TABLET ORAL EVERY 4 HOURS PRN
Status: DISCONTINUED | OUTPATIENT
Start: 2018-04-20 | End: 2018-04-20

## 2018-04-20 RX ORDER — HYDROCODONE BITARTRATE AND ACETAMINOPHEN 5; 325 MG/1; MG/1
2 TABLET ORAL EVERY 4 HOURS PRN
Status: DISCONTINUED | OUTPATIENT
Start: 2018-04-20 | End: 2018-04-21

## 2018-04-20 RX ORDER — HYDROCODONE BITARTRATE AND ACETAMINOPHEN 5; 325 MG/1; MG/1
1 TABLET ORAL EVERY 4 HOURS PRN
Status: DISCONTINUED | OUTPATIENT
Start: 2018-04-20 | End: 2018-04-21

## 2018-04-20 RX ORDER — OXYBUTYNIN CHLORIDE 5 MG/1
5 TABLET ORAL EVERY 6 HOURS PRN
Status: DISCONTINUED | OUTPATIENT
Start: 2018-04-20 | End: 2018-04-21

## 2018-04-20 RX ORDER — SODIUM CHLORIDE 9 MG/ML
INJECTION, SOLUTION INTRAVENOUS CONTINUOUS
Status: DISCONTINUED | OUTPATIENT
Start: 2018-04-20 | End: 2018-04-20

## 2018-04-20 RX ORDER — DIAZEPAM 5 MG/1
5 TABLET ORAL EVERY 8 HOURS PRN
Status: DISCONTINUED | OUTPATIENT
Start: 2018-04-20 | End: 2018-04-21

## 2018-04-20 RX ORDER — DOCUSATE SODIUM 100 MG/1
100 CAPSULE, LIQUID FILLED ORAL 2 TIMES DAILY
Status: DISCONTINUED | OUTPATIENT
Start: 2018-04-20 | End: 2018-04-21

## 2018-04-20 NOTE — PROGRESS NOTES
[facility] Pharmacy Note:  Renal Adjustment for levofloxacin (Nohemi Prom)    Charley White is a 46year old female who has been prescribed levofloxacin (LEVAQUIN) 500 mg x1 in ER.   CrCl is estimated creatinine clearance is 57.3 mL/min (A) (based on SCr of 1.1 mg/

## 2018-04-20 NOTE — PROGRESS NOTES
IM addendum to Dr. Gaurav Kothari H&P:    Pt seen and examined. No acute events, no new complaints. s/p cysto with stone manipulation and stent placement. Cont IV abx, await final UCx and sensitivities, shows Citrobacter diversus.     /82 (BP Location: [facility]

## 2018-04-20 NOTE — H&P
[facility] HOSPITALIST  History and Physical     Daly Mcdonald Patient Status:  Inpatient    3/18/1970 MRN [id number]   [facility] 3NW-A Attending Misael Hu MD   Hosp Day # 1 PCP Faby Contreras DO     Chief Complaint: flank pain    Histo Heart Attack Maternal Grandmother 80     cause of death   • Cancer Father 48     lymphoma, d. 48   • Hypertension Mother 39   • Cancer Paternal Uncle      several, unknown   • Macular degeneration Neg    • Glaucoma Neg    • Diabetes Neg        Allergies: mood and affect.       Diagnostic Data:      Labs:  Recent Labs   Lab  04/18/18   1415  04/19/18   1700   WBC  10.7  10.5   HGB  13.3  13.1   MCV  95.9  94.2   PLT  123*  132.0*       Recent Labs   Lab  04/18/18   1415  04/19/18   1700   GLU  84  115*   BUN

## 2018-04-20 NOTE — BRIEF OP NOTE
Pre-Operative Diagnosis: UROSEPSIS, RIGHT URETERAL CALCULI, RIGHT FLANK PAIN     Post-Operative Diagnosis: UROSEPSIS, RIGHT URETERAL CALCULI, RIGHT FLANK PAIN     Procedure Performed:   Procedure(s):  CYSTOSCOPY, RIGHT RPG, RIGHT STENT INSERTION, FLUOROPSC

## 2018-04-20 NOTE — ED PROVIDER NOTES
Patient Seen in: [facility] Emergency Department    History   Patient presents with:  Fever (infectious)    Stated Complaint: fever 103F, chills x 4 days    HPI    49-year-old female presents with fevers, chills, body aches.   She was diagnosed one d signs reviewed. All other systems reviewed and negative except as noted above.     Physical Exam   ED Triage Vitals [04/19/18 1630]  BP: 112/71  Pulse: 125  Resp: 20  Temp: 99.1 °F (37.3 °C)  Temp src: Oral  SpO2: 98 %  O2 Device: None (Room air)    Cu The following orders were created for panel order CBC WITH DIFFERENTIAL WITH PLATELET.   Procedure                               Abnormality         Status                     ---------                               -----------         ------ No mass or adenopathy. BOWEL/MESENTERY:  No visible mass, obstruction, or bowel wall thickening. ABDOMINAL WALL:  No mass or hernia. URINARY BLADDER:  No visible focal wall thickening, lesion, or calculus. PELVIC NODES:  No adenopathy.   PELVIC ORGANS:

## 2018-04-20 NOTE — H&P
History & Physical Examination    Patient Name: Kashif Rosales  MRN: [id number]  [facility]: [id number]  YOB: 1970    Diagnosis: UROSEPSIS, RIGHT PROXIMAL URETERAL STONE    Present Illness: UROSEPSIS, RIGHT PROXIMAL URETERAL STONE      (Not in a hos discussed the risks and benefits and alternatives with the patient/family. They understand and agree to proceed with plan of care. [ x ] I have reviewed the History and Physical done within the last 30 days. Any changes noted above.     PLAN: EMERGENCY C

## 2018-04-20 NOTE — ED NOTES
Per pharmacy, Levaquin IV will be verified & sent to ER, this RN informed pharmacist about impending sx, will tube IVABT once verified to OR instead. Tiny Hidalgo made aware will infuse IVABT once available.  Pt kept on NPO, OR aware of last intake was 1930 for ora

## 2018-04-20 NOTE — ANESTHESIA PREPROCEDURE EVALUATION
PRE-OP EVALUATION    Patient Name: Jitendra Lomeli    Pre-op Diagnosis: Pain [R52]    Procedure(s):  CYSTOSCOPY STENT INSERTION    Surgeon(s) and Role:     Stacey Ortiz MD - Primary    Pre-op vitals reviewed.   Temp: 99.3 °F (37.4 °C)  Pulse: 98  Resp: Never Used    Alcohol use No       Drug use: No     Available pre-op labs reviewed.     Lab Results  Component Value Date   WBC 10.5 04/19/2018   RBC 4.12 04/19/2018   HGB 13.1 04/19/2018   HCT 38.8 04/19/2018   MCV 94.2 04/19/2018   MCH 31.8 04/19/2018   M

## 2018-04-20 NOTE — TELEPHONE ENCOUNTER
Patient was in [facility] Dr. Robert Babcock placed stent in kidney. Needs referral for appointment next week with Dr. Meryle Nailer. Please Symone Shankar when referral is done at [phone].   Routed to clinical.

## 2018-04-20 NOTE — CONSULTS
[facility]  Report of Consultation    Opal Marino Patient Status:  Emergency    3/18/1970 MRN [id number]   Location [address] [facility] Attending Garrett Judd MD   Hosp Day # 0 PCP Liliana Lyle,      Reason for Consultation: encounter.      Review of Systems:  A comprehensive review of systems was negative except for: Constitutional: positive for chills, fatigue, fevers and malaise  Gastrointestinal: positive for abdominal pain, nausea and vomiting  Genitourinary: positive for performed from the dome of the diaphragm to the pubic symphysis with non-ionic intravenous contrast material. Post contrast coronal MPR imaging was performed. Dose reduction techniques were used.  Dose information is   transmitted to the ACR ([facility] Urosepsis  Right proximal ureteral stone  Right flank pain  Right hydronephrosis    Recommendations:  IV ATB  CYSTO, RIGHT RPG, RIGHT STENT PLACEMENT  FUTURE INTERVENTION ONCE INFECTION RESOLVED    Thank you for allowing me to participate in the care

## 2018-04-20 NOTE — PROGRESS NOTES
[facility]  Urology Progress Note    Jahaira Salmon Patient Status:  Inpatient    3/18/1970 MRN [id number]   [facility] 3NW-A Attending Veda Bob MD   [facility] Day # 1 PCP Jelena Decker, DO     Subjective:  Jahaira Salmon is a(n 46 ye procedure once infection has been treated  Ureteral stent related symptoms reviewed with patient    Yesica Romero P.A.-C  [facility] Urology  4/20/2018  9:19 AM

## 2018-04-20 NOTE — PAYOR COMM NOTE
--------------  ADMISSION REVIEW       4/19    ED LATE      Fever      Stated Complaint: fever 103F, chills x 4 days          23-year-old female presents with fevers, chills, body aches. She was diagnosed one day ago with a urinary tract infection.     Jasmina Hidalgo The following orders were created for panel order CBC WITH DIFFERENTIAL WITH PLATELET.   Procedure                               Abnormality         Status                     ---------                               -----------         ------

## 2018-04-20 NOTE — ED NOTES
Pt for surgery for kidney stomne retrieval, ORRN updated of pt's status. Dr Amanda Scott with orders for IVABT (levaquin). Transport here for transfer to OR.

## 2018-04-20 NOTE — PLAN OF CARE
Received pt awake & alert from PACU. IV infusing in right hand, site clear. Dyer catheter in pace, draining light pink tinged urine. Pt denies pain . [address] [place] [address] completed, reviewed post op meds & plan of care. Dr Jimenez Dumont here to see pt, orders noted.

## 2018-04-20 NOTE — OPERATIVE REPORT
[facility]    PATIENT'S NAME: Robert Love   ATTENDING PHYSICIAN: Espinoza Mann M.D. OPERATING PHYSICIAN: Espinoza Mann M.D.    PATIENT ACCOUNT#:   [de-identified]    LOCATION:  PACU [address] [place] [address] PACU 2 EDWP 10  MEDICAL RECORD #:   [id number]       DATE OF BIRTH: as the bladder upon removal of the Glidewire. The bladder was then evacuated of irrigant, and a 16-Setswana Dyer catheter was placed and connected to gravity drainage.   The patient tolerated the procedure well and was then transferred to the recovery room

## 2018-04-21 VITALS
HEART RATE: 94 BPM | OXYGEN SATURATION: 99 % | RESPIRATION RATE: 18 BRPM | DIASTOLIC BLOOD PRESSURE: 96 MMHG | BODY MASS INDEX: 24.59 KG/M2 | SYSTOLIC BLOOD PRESSURE: 118 MMHG | WEIGHT: 153 LBS | TEMPERATURE: 98 F | HEIGHT: 66 IN

## 2018-04-21 PROCEDURE — 99239 HOSP IP/OBS DSCHRG MGMT >30: CPT | Performed by: HOSPITALIST

## 2018-04-21 RX ORDER — SULFAMETHOXAZOLE AND TRIMETHOPRIM 800; 160 MG/1; MG/1
1 TABLET ORAL 2 TIMES DAILY
Qty: 28 TABLET | Refills: 1 | Status: SHIPPED | OUTPATIENT
Start: 2018-04-21 | End: 2018-05-05

## 2018-04-21 NOTE — PROGRESS NOTES
IM quick note:    Pt seen and examined. No acute events, no new complaints, afebrile.     /96 (BP Location: Right arm)   Pulse 94   Temp 98.1 °F (36.7 °C) (Oral)   Resp 18   Ht 5' 6\" (1.676 m)   Wt 153 lb (69.4 kg)   LMP 03/24/2018   SpO2 99%   BMI

## 2018-04-21 NOTE — PROGRESS NOTES
[address] [facility] Patient Status:  Inpatient    3/18/1970 MRN [id number]   [facility] 3NW-A Attending Justo Isaac MD   Hosp Day # 2 PCP Obie King DO     Subjective: Interval History: has no complaint of pain. Nusrat Donovan

## 2018-04-21 NOTE — PLAN OF CARE
PAIN - ADULT    • Verbalizes/displays adequate comfort level or patient's stated pain goal Progressing        Patient/Family Goals    • Patient/Family Long Term Goal Progressing    • Patient/Family Short Term Goal Progressing            Patient A&O, VSS.  D

## 2018-04-21 NOTE — PLAN OF CARE
Written and verbal discharge instructions given to patient and  verbalize understanding. IV discontinued in , angio-cath tip intact, site free from redness, swelling, or drainage, patient denies pain at site. Dressing applied.   Prescription given for  Lorri Sandhoff

## 2018-04-21 NOTE — PLAN OF CARE
PAIN - ADULT    • Verbalizes/displays adequate comfort level or patient's stated pain goal Completed        Patient/Family Goals    • Patient/Family Long Term Goal Completed    • Patient/Family Short Term Goal Completed        Written and verbal discharge

## 2018-04-22 NOTE — DISCHARGE SUMMARY
[facility] HOSPITALIST  DISCHARGE SUMMARY     Park Mauro Patient Status:  Inpatient    3/18/1970 MRN [id number]   [facility] 3NW-A Attending No att. providers found   Hosp Day # 2 PCP John Kirby DO     Date of Admission: 2018  Date o urology in consultation. She underwent cystoscopy with right RGPG and right ureteral stone manipulation with stent placement. She tolerated the procedure well and her symptoms improved.   She was d/c home on PO abx per urology and will f/u with them as an [94] 94  Resp:  [18] 18  BP: (118)/(96) 118/96    Physical Exam:    General: No acute distress. Respiratory: Clear to auscultation bilaterally. No wheezes. No rhonchi. Cardiovascular: S1, S2. Regular rate and rhythm. No murmurs, rubs or gallops.    Abdom

## 2018-04-23 NOTE — TELEPHONE ENCOUNTER
As FYI to DR. STAFFORD - called patient and relayed DR. STAFFORD message - verbalized understanding. Patient states she was released form hospital Saturday , fever gone  , on antibiotic and Tylenol PRN.  She is taking it easy and just has mild abdominal discomfort # 2

## 2018-04-24 ENCOUNTER — TELEPHONE (OUTPATIENT)
Dept: INTERNAL MEDICINE CLINIC | Facility: CLINIC | Age: 48
End: 2018-04-24

## 2018-04-24 DIAGNOSIS — N20.0 NEPHROLITHIASIS: Primary | ICD-10-CM

## 2018-04-24 NOTE — TELEPHONE ENCOUNTER
Pt is calling she needs a referral for Dr Jayro Choe on 4/27 she will be having a shock wave treatment to release the kidney stone  On 4/31 if the stone has passed she will be having the stent removed  Pt needs to have stated for the referrals that Dr Torres Agent

## 2018-04-24 NOTE — TELEPHONE ENCOUNTER
Pt returned call  CPT code: 94881/ second part:  5223 follow up procedure 5/2/18  Would take place in [address] [place] [address], all other information should be in [address]  Tasked to nursing

## 2018-04-24 NOTE — TELEPHONE ENCOUNTER
Dr Espinoza Mann is not in network. Referral has been completed for office visits.   If patient is having surgery, we would need location of surgery and CPT codes in order to place referral.

## 2018-04-24 NOTE — TELEPHONE ENCOUNTER
Vanda's message relayed to pt who verbalized understanding. She will contact Dr. Kathy Anthony office for appropriate information and call us back.

## 2018-04-25 NOTE — PAYOR COMM NOTE
--------------  CONTINUED STAY REVIEW    Payor: [employer] Noel Huntley 1 #:  [id number]  Authorization Number: [id number]/A    Admit date: 4/19/18  Admit time: 2140    Admitting Physician: Caro Scales MD  Attending Physician:  No att. providers found  Primary Care K 5.0 04/20/2018    04/19/2018   CO2 23.0 04/19/2018    04/19/2018   CA 9.5 04/19/2018   ALB 3.0 04/19/2018   ALKPHO 73 04/19/2018   BILT 0.8 04/19/2018   TP 7.9 04/19/2018   AST 35 04/19/2018   ALT 33 04/19/2018         Assessment:    Right [Urine:200]     ABD  soft     Labs:        Lab Results  Component Value Date   CREATSERUM 0.81 04/21/2018   BUN 11 04/21/2018    04/21/2018   K 4.0 04/21/2018    04/21/2018   CO2 26.0 04/21/2018    04/21/2018   CA 9.3 04/21/2018        protocol for discharge care transition. Hospital Discharge Diagnoses: see below    Lace+ Score: 17  59-90 High Risk  29-58 Medium Risk  0-28   Low Risk.     TCM Follow-Up Recommendation:  LACE < 29: Low Risk of readmission after discharge from the hospit 800-160 MG Tabs per tablet  Commonly known as:  BACTRIM DS      Take 1 tablet by mouth 2 (two) times daily.    Stop taking on:  5/5/2018  Quantity:  28 tablet  Refills:  1        CONTINUE taking these medications      Instructions Prescription details   ace 4/22/2018 10:05 AM         REVIEWER COMMENTS  4/21/18 was still on IV ABT, please fax certified IP days  And dates for approved length of stay, please advise if any further clinicals needed for review completion

## 2018-05-01 NOTE — PROGRESS NOTES
[facility]    Patients Name: Daly Mcdonald  Attending Physician: Dior att. providers found  CSN: [id number]    Location:  324/324-A  MRN: [id number]    YOB: 1970  Admission Date: 4/19/2018     Anesthesia Post-op Note    Procedure(s):  CYS

## 2018-05-08 ENCOUNTER — TELEPHONE (OUTPATIENT)
Dept: INTERNAL MEDICINE CLINIC | Facility: CLINIC | Age: 48
End: 2018-05-08

## 2018-05-08 DIAGNOSIS — D35.2 PITUITARY ADENOMA (HCC): Primary | ICD-10-CM

## 2018-05-19 ENCOUNTER — LAB ENCOUNTER (OUTPATIENT)
Dept: LAB | Facility: HOSPITAL | Age: 48
End: 2018-05-19
Attending: INTERNAL MEDICINE
Payer: COMMERCIAL

## 2018-05-19 DIAGNOSIS — E22.1 HYPERPROLACTINEMIA (HCC): Primary | ICD-10-CM

## 2018-05-19 PROCEDURE — 36415 COLL VENOUS BLD VENIPUNCTURE: CPT

## 2018-05-19 PROCEDURE — 84146 ASSAY OF PROLACTIN: CPT

## 2018-05-19 PROCEDURE — 80053 COMPREHEN METABOLIC PANEL: CPT

## 2018-05-24 ENCOUNTER — LAB ENCOUNTER (OUTPATIENT)
Dept: LAB | Facility: HOSPITAL | Age: 48
End: 2018-05-24
Attending: OBSTETRICS & GYNECOLOGY
Payer: COMMERCIAL

## 2018-05-24 ENCOUNTER — OFFICE VISIT (OUTPATIENT)
Dept: INTERNAL MEDICINE CLINIC | Facility: CLINIC | Age: 48
End: 2018-05-24

## 2018-05-24 ENCOUNTER — HOSPITAL ENCOUNTER (OUTPATIENT)
Dept: MAMMOGRAPHY | Facility: HOSPITAL | Age: 48
Discharge: HOME | End: 2018-05-24
Attending: OBSTETRICS & GYNECOLOGY
Payer: COMMERCIAL

## 2018-05-24 VITALS
BODY MASS INDEX: 25 KG/M2 | TEMPERATURE: 99 F | WEIGHT: 156 LBS | SYSTOLIC BLOOD PRESSURE: 104 MMHG | DIASTOLIC BLOOD PRESSURE: 72 MMHG | HEART RATE: 84 BPM

## 2018-05-24 DIAGNOSIS — N20.0 KIDNEY STONE: ICD-10-CM

## 2018-05-24 DIAGNOSIS — N20.0 KIDNEY STONE: Primary | ICD-10-CM

## 2018-05-24 DIAGNOSIS — D69.6 THROMBOCYTOPENIA (HCC): ICD-10-CM

## 2018-05-24 DIAGNOSIS — Z12.39 ENCOUNTER FOR SCREENING FOR MALIGNANT NEOPLASM OF BREAST: ICD-10-CM

## 2018-05-24 PROCEDURE — 99213 OFFICE O/P EST LOW 20 MIN: CPT | Performed by: INTERNAL MEDICINE

## 2018-05-24 PROCEDURE — 87086 URINE CULTURE/COLONY COUNT: CPT

## 2018-05-24 PROCEDURE — 77063 BREAST TOMOSYNTHESIS BI: CPT | Performed by: OBSTETRICS & GYNECOLOGY

## 2018-05-24 PROCEDURE — 99212 OFFICE O/P EST SF 10 MIN: CPT | Performed by: INTERNAL MEDICINE

## 2018-05-24 PROCEDURE — 77067 SCR MAMMO BI INCL CAD: CPT | Performed by: OBSTETRICS & GYNECOLOGY

## 2018-05-24 PROCEDURE — 81001 URINALYSIS AUTO W/SCOPE: CPT

## 2018-05-24 PROCEDURE — 87077 CULTURE AEROBIC IDENTIFY: CPT
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
## 2018-05-25 ENCOUNTER — TELEPHONE (OUTPATIENT)
Dept: INTERNAL MEDICINE CLINIC | Facility: CLINIC | Age: 48
End: 2018-05-25

## 2018-05-25 NOTE — TELEPHONE ENCOUNTER
Dr. Ambrosio Hairston,   I saw Joseph Domingo in the office on 5/24. She still has 2/10 R flank/back pain. She was not having any urinary complaints, but with the recent history, I advised getting UA. UA is showing some calcium oxalate crystals.  I advised her to get the xr

## 2018-05-26 ENCOUNTER — HOSPITAL ENCOUNTER (OUTPATIENT)
Dept: GENERAL RADIOLOGY | Facility: HOSPITAL | Age: 48
Discharge: HOME | End: 2018-05-26
Attending: UROLOGY
Payer: COMMERCIAL

## 2018-05-26 ENCOUNTER — LAB ENCOUNTER (OUTPATIENT)
Dept: LAB | Facility: HOSPITAL | Age: 48
End: 2018-05-26
Attending: INTERNAL MEDICINE
Payer: COMMERCIAL

## 2018-05-26 DIAGNOSIS — D69.6 THROMBOCYTOPENIA (HCC): ICD-10-CM

## 2018-05-26 DIAGNOSIS — Z00.00 ANNUAL PHYSICAL EXAM: Primary | ICD-10-CM

## 2018-05-26 DIAGNOSIS — N20.0 CALCULUS OF KIDNEY: ICD-10-CM

## 2018-05-26 DIAGNOSIS — N20.0 KIDNEY STONE: ICD-10-CM

## 2018-05-26 PROCEDURE — 80061 LIPID PANEL: CPT

## 2018-05-26 PROCEDURE — 74018 RADEX ABDOMEN 1 VIEW: CPT | Performed by: UROLOGY

## 2018-05-26 PROCEDURE — 85025 COMPLETE CBC W/AUTO DIFF WBC: CPT

## 2018-05-26 PROCEDURE — 36415 COLL VENOUS BLD VENIPUNCTURE: CPT

## 2018-05-28 ENCOUNTER — PATIENT MESSAGE (OUTPATIENT)
Dept: INTERNAL MEDICINE CLINIC | Facility: CLINIC | Age: 48
End: 2018-05-28

## 2018-10-09 ENCOUNTER — TELEPHONE (OUTPATIENT)
Dept: INTERNAL MEDICINE CLINIC | Facility: CLINIC | Age: 48
End: 2018-10-09

## 2018-10-09 DIAGNOSIS — N20.1 RIGHT URETERAL STONE: Primary | ICD-10-CM

## 2018-10-09 NOTE — TELEPHONE ENCOUNTER
Pt is requesttng a referral for Dr Asael Lee pt is scheduled for a 6 month follow up on 10/24,   Dr Jacob Lara phone #[phone]    Tasked to nursing

## 2018-12-14 ENCOUNTER — TELEPHONE (OUTPATIENT)
Dept: OPHTHALMOLOGY | Facility: CLINIC | Age: 48
End: 2018-12-14

## 2018-12-14 DIAGNOSIS — H46.9 OPTIC NEUROPATHY: Primary | ICD-10-CM

## 2018-12-18 ENCOUNTER — OFFICE VISIT (OUTPATIENT)
Dept: OPHTHALMOLOGY | Facility: CLINIC | Age: 48
End: 2018-12-18
Payer: COMMERCIAL

## 2018-12-18 DIAGNOSIS — H52.03 HYPEROPIA WITH PRESBYOPIA OF BOTH EYES: ICD-10-CM

## 2018-12-18 DIAGNOSIS — H52.4 HYPEROPIA WITH PRESBYOPIA OF BOTH EYES: ICD-10-CM

## 2018-12-18 DIAGNOSIS — H40.003 GLAUCOMA SUSPECT OF BOTH EYES: Primary | ICD-10-CM

## 2018-12-18 PROCEDURE — 92250 FUNDUS PHOTOGRAPHY W/I&R: CPT | Performed by: OPHTHALMOLOGY

## 2018-12-18 PROCEDURE — 99212 OFFICE O/P EST SF 10 MIN: CPT | Performed by: OPHTHALMOLOGY

## 2018-12-18 PROCEDURE — 92015 DETERMINE REFRACTIVE STATE: CPT | Performed by: OPHTHALMOLOGY

## 2018-12-18 PROCEDURE — 99243 OFF/OP CNSLTJ NEW/EST LOW 30: CPT | Performed by: OPHTHALMOLOGY
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
## 2018-12-18 NOTE — PROGRESS NOTES
Regina Pastor is a 46year old female. HPI:     HPI     Consult      Additional comments: Consult per Dr. Bob Ospina     EP/  Patient is here for complete exam with photos.   Patient denies any problems with her vision, but says that so tablets by mouth once a week. Disp:  Rfl: 1   Multiple Vitamins-Minerals (WOMENS MULTIVITAMIN PLUS) Oral Tab Take  by mouth daily.  Disp:  Rfl:        Allergies:    Amoxicillin             HIVES  Penicillins             HIVES    ROS:     ROS     Positive fo Refraction       Sphere Cylinder [place] Dist [place] Add [place]    Right +4.25 Sphere  20/20-1 +2.00 20/20    Left +0.75 Sphere  20/20- +2.00 20/20          Manifest Refraction #2       Sphere Cylinder [place] Dist [place] Add [place]    Right +4.75 Sphere        Left +
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
## 2018-12-26 ENCOUNTER — NURSE ONLY (OUTPATIENT)
Dept: OPHTHALMOLOGY | Facility: CLINIC | Age: 48
End: 2018-12-26
Payer: COMMERCIAL

## 2018-12-26 DIAGNOSIS — H40.003 GLAUCOMA SUSPECT OF BOTH EYES: ICD-10-CM

## 2018-12-26 PROCEDURE — 92083 EXTENDED VISUAL FIELD XM: CPT | Performed by: OPHTHALMOLOGY

## 2018-12-26 PROCEDURE — 92133 CPTRZD OPH DX IMG PST SGM ON: CPT | Performed by: OPHTHALMOLOGY

## 2018-12-27 NOTE — PROGRESS NOTES
Pam Lerma is a 46year old female.     HPI:     HPI     Here for a VF, OCT with no MD.     Last edited by Claribel Sky OANASTACIO. on 12/26/2018  3:11 PM. (History)        Patient History:  Past Medical History:   Diagnosis Date   • Chicken pox    • Me HIVES    ROS:       PHYSICAL EXAM:      Not recorded           ASSESSMENT/PLAN:     Diagnoses and Plan:     Glaucoma suspect  Normal VF, OCT, OD. 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       No orders of the defined types were placed

## 2019-03-27 ENCOUNTER — TELEPHONE (OUTPATIENT)
Dept: INTERNAL MEDICINE CLINIC | Facility: CLINIC | Age: 49
End: 2019-03-27

## 2019-03-27 DIAGNOSIS — D35.2 PITUITARY ADENOMA (HCC): ICD-10-CM

## 2019-03-27 DIAGNOSIS — Z00.00 ANNUAL PHYSICAL EXAM: Primary | ICD-10-CM

## 2019-03-27 DIAGNOSIS — Z87.442 HISTORY OF NEPHROLITHIASIS: ICD-10-CM

## 2019-03-27 DIAGNOSIS — R53.83 FATIGUE, UNSPECIFIED TYPE: ICD-10-CM

## 2019-03-27 NOTE — TELEPHONE ENCOUNTER
Pt scheduled appt for physical on 4/5/19  Pt will go for labs prior, please be sure order is in the system  Tasked to nursing

## 2019-03-27 NOTE — TELEPHONE ENCOUNTER
To Dr. Serena Dallas, patient requesting labs for her physical. Lab orders pending per protocol for your review. Anything you would like us to add/remove? Thanks!

## 2019-03-28 NOTE — TELEPHONE ENCOUNTER
Please notify patient I placed orders for fasting blood work-this will also include her prolactin test and a urine test (due to her history).

## 2019-03-31 ENCOUNTER — LAB ENCOUNTER (OUTPATIENT)
Dept: LAB | Facility: HOSPITAL | Age: 49
End: 2019-03-31
Attending: INTERNAL MEDICINE
Payer: COMMERCIAL

## 2019-03-31 DIAGNOSIS — D35.2 PITUITARY ADENOMA (HCC): ICD-10-CM

## 2019-03-31 DIAGNOSIS — R53.83 FATIGUE, UNSPECIFIED TYPE: ICD-10-CM

## 2019-03-31 DIAGNOSIS — Z00.00 ANNUAL PHYSICAL EXAM: ICD-10-CM

## 2019-03-31 LAB
BILIRUB UR QL: NEGATIVE
CLARITY UR: CLEAR
COLOR UR: YELLOW
GLUCOSE UR-MCNC: NEGATIVE MG/DL
KETONES UR-MCNC: NEGATIVE MG/DL
LEUKOCYTE ESTERASE UR QL STRIP.AUTO: NEGATIVE
NITRITE UR QL STRIP.AUTO: NEGATIVE
PH UR: 6 [PH] (ref 5–8)
PROT UR-MCNC: NEGATIVE MG/DL
RBC #/AREA URNS AUTO: 2 /HPF
SP GR UR STRIP: 1.02 (ref 1–1.03)
UROBILINOGEN UR STRIP-ACNC: <2
VIT C UR-MCNC: NEGATIVE MG/DL
WBC #/AREA URNS AUTO: 1 /HPF

## 2019-03-31 PROCEDURE — 80053 COMPREHEN METABOLIC PANEL: CPT

## 2019-03-31 PROCEDURE — 85025 COMPLETE CBC W/AUTO DIFF WBC: CPT

## 2019-03-31 PROCEDURE — 84146 ASSAY OF PROLACTIN: CPT

## 2019-03-31 PROCEDURE — 81001 URINALYSIS AUTO W/SCOPE: CPT | Performed by: INTERNAL MEDICINE

## 2019-03-31 PROCEDURE — 36415 COLL VENOUS BLD VENIPUNCTURE: CPT

## 2019-03-31 PROCEDURE — 84443 ASSAY THYROID STIM HORMONE: CPT

## 2019-03-31 PROCEDURE — 80061 LIPID PANEL: CPT

## 2019-04-05 ENCOUNTER — OFFICE VISIT (OUTPATIENT)
Dept: INTERNAL MEDICINE CLINIC | Facility: CLINIC | Age: 49
End: 2019-04-05
Payer: COMMERCIAL

## 2019-04-05 VITALS
BODY MASS INDEX: 26.35 KG/M2 | DIASTOLIC BLOOD PRESSURE: 88 MMHG | HEART RATE: 86 BPM | SYSTOLIC BLOOD PRESSURE: 124 MMHG | WEIGHT: 162 LBS | HEIGHT: 65.6 IN | TEMPERATURE: 99 F | OXYGEN SATURATION: 98 %

## 2019-04-05 DIAGNOSIS — Z87.442 HISTORY OF NEPHROLITHIASIS: ICD-10-CM

## 2019-04-05 DIAGNOSIS — D35.2 PITUITARY ADENOMA (HCC): ICD-10-CM

## 2019-04-05 DIAGNOSIS — H54.7 VISION LOSS: ICD-10-CM

## 2019-04-05 DIAGNOSIS — Z00.00 ANNUAL PHYSICAL EXAM: Primary | ICD-10-CM

## 2019-04-05 PROCEDURE — 99396 PREV VISIT EST AGE 40-64: CPT | Performed by: INTERNAL MEDICINE
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
## 2019-04-07 PROBLEM — N39.0 URINARY TRACT INFECTION WITHOUT HEMATURIA, SITE UNSPECIFIED: Status: RESOLVED | Noted: 2018-04-19 | Resolved: 2019-04-07

## 2019-04-07 PROBLEM — N20.1 RIGHT URETERAL STONE: Status: RESOLVED | Noted: 2018-04-20 | Resolved: 2019-04-07

## 2019-06-01 ENCOUNTER — HOSPITAL ENCOUNTER (OUTPATIENT)
Dept: MRI IMAGING | Age: 49
Discharge: HOME | End: 2019-06-01
Attending: INTERNAL MEDICINE
Payer: COMMERCIAL

## 2019-06-01 ENCOUNTER — HOSPITAL ENCOUNTER (OUTPATIENT)
Dept: MAMMOGRAPHY | Age: 49
Discharge: HOME | End: 2019-06-01
Attending: OBSTETRICS & GYNECOLOGY
Payer: COMMERCIAL

## 2019-06-01 DIAGNOSIS — D35.2 PITUITARY ADENOMA (HCC): ICD-10-CM

## 2019-06-01 DIAGNOSIS — Z12.31 ENCOUNTER FOR SCREENING MAMMOGRAM FOR MALIGNANT NEOPLASM OF BREAST: ICD-10-CM

## 2019-06-01 PROCEDURE — 77067 SCR MAMMO BI INCL CAD: CPT | Performed by: OBSTETRICS & GYNECOLOGY

## 2019-06-01 PROCEDURE — 77063 BREAST TOMOSYNTHESIS BI: CPT | Performed by: OBSTETRICS & GYNECOLOGY

## 2019-06-05 ENCOUNTER — APPOINTMENT (OUTPATIENT)
Dept: DERMATOLOGY | Age: 49
End: 2019-06-05

## 2019-06-15 ENCOUNTER — HOSPITAL ENCOUNTER (OUTPATIENT)
Dept: MRI IMAGING | Facility: HOSPITAL | Age: 49
Discharge: HOME | End: 2019-06-15
Attending: INTERNAL MEDICINE
Payer: COMMERCIAL

## 2019-06-15 PROCEDURE — 70553 MRI BRAIN STEM W/O & W/DYE: CPT | Performed by: INTERNAL MEDICINE

## 2019-06-15 PROCEDURE — A9575 INJ GADOTERATE MEGLUMI 0.1ML: HCPCS | Performed by: INTERNAL MEDICINE

## 2019-06-18 ENCOUNTER — OFFICE VISIT (OUTPATIENT)
Dept: DERMATOLOGY | Age: 49
End: 2019-06-18

## 2019-06-18 DIAGNOSIS — L81.0 POSTINFLAMMATORY HYPERPIGMENTATION: ICD-10-CM

## 2019-06-18 DIAGNOSIS — L70.9 ACNE, UNSPECIFIED ACNE TYPE: ICD-10-CM

## 2019-06-18 DIAGNOSIS — L91.8 INFLAMED SKIN TAG: ICD-10-CM

## 2019-06-18 DIAGNOSIS — D22.4 NEVUS OF NECK: Primary | ICD-10-CM

## 2019-06-18 PROCEDURE — 99202 OFFICE O/P NEW SF 15 MIN: CPT | Performed by: DERMATOLOGY

## 2019-06-19 ENCOUNTER — TELEPHONE (OUTPATIENT)
Dept: DERMATOLOGY | Age: 49
End: 2019-06-19

## 2019-06-24 ENCOUNTER — TELEPHONE (OUTPATIENT)
Dept: DERMATOLOGY | Age: 49
End: 2019-06-24

## 2019-06-24 ENCOUNTER — LAB ENCOUNTER (OUTPATIENT)
Dept: LAB | Facility: HOSPITAL | Age: 49
End: 2019-06-24
Attending: INTERNAL MEDICINE
Payer: COMMERCIAL

## 2019-06-24 DIAGNOSIS — E22.1 HYPERPROLACTINEMIA (HCC): Primary | ICD-10-CM

## 2019-06-24 DIAGNOSIS — D35.2 PITUITARY ADENOMA (HCC): ICD-10-CM

## 2019-06-24 PROCEDURE — 36415 COLL VENOUS BLD VENIPUNCTURE: CPT

## 2019-06-24 PROCEDURE — 84146 ASSAY OF PROLACTIN: CPT

## 2019-08-07 ENCOUNTER — OFFICE VISIT (OUTPATIENT)
Dept: INTERNAL MEDICINE CLINIC | Facility: CLINIC | Age: 49
End: 2019-08-07
Payer: COMMERCIAL

## 2019-08-07 VITALS
WEIGHT: 155.19 LBS | HEIGHT: 65.6 IN | HEART RATE: 110 BPM | SYSTOLIC BLOOD PRESSURE: 98 MMHG | OXYGEN SATURATION: 98 % | DIASTOLIC BLOOD PRESSURE: 70 MMHG | BODY MASS INDEX: 25.24 KG/M2 | TEMPERATURE: 99 F

## 2019-08-07 DIAGNOSIS — L50.9 HIVES: Primary | ICD-10-CM

## 2019-08-07 PROCEDURE — 99213 OFFICE O/P EST LOW 20 MIN: CPT | Performed by: INTERNAL MEDICINE

## 2019-08-07 NOTE — PROGRESS NOTES
Sandy Sherman is a 44year old female. Patient presents with:  Hives: returned from [place] yesterday. Developed hives while on vacation. Rash started on 7/24/19 (chest and neck). c/o burning. She saw MD on in [place] on 7/29.  He prescibed co ELECTROCARDIOGRAM, COMPLETE  03/03/2013    scanned to media tab   • ESWL LITHOTRIPSY WITH CYSTOSCOPY, STENT PLACEMENT Right 4/27/2018    Performed by Chapin Lopez MD at [address] [place] [address]

## 2019-11-01 LAB — COLONOSCOPY STUDY: NORMAL

## 2019-12-12 PROCEDURE — 88305 TISSUE EXAM BY PATHOLOGIST: CPT | Performed by: SPECIALIST

## 2019-12-14 ENCOUNTER — LAB ENCOUNTER (OUTPATIENT)
Dept: LAB | Facility: HOSPITAL | Age: 49
End: 2019-12-14
Attending: INTERNAL MEDICINE
Payer: COMMERCIAL

## 2019-12-14 DIAGNOSIS — E22.1 IDIOPATHIC HYPERPROLACTINEMIA (HCC): Primary | ICD-10-CM

## 2019-12-14 DIAGNOSIS — D35.2 PITUITARY ADENOMA (HCC): ICD-10-CM

## 2019-12-14 PROCEDURE — 36415 COLL VENOUS BLD VENIPUNCTURE: CPT

## 2019-12-14 PROCEDURE — 84146 ASSAY OF PROLACTIN: CPT

## 2019-12-14 PROCEDURE — 80048 BASIC METABOLIC PNL TOTAL CA: CPT

## 2019-12-14 PROCEDURE — 85025 COMPLETE CBC W/AUTO DIFF WBC: CPT

## 2020-01-08 ENCOUNTER — OFFICE VISIT (OUTPATIENT)
Dept: OPHTHALMOLOGY | Facility: CLINIC | Age: 50
End: 2020-01-08
Payer: COMMERCIAL

## 2020-01-08 DIAGNOSIS — H52.4 HYPEROPIA WITH PRESBYOPIA OF BOTH EYES: ICD-10-CM

## 2020-01-08 DIAGNOSIS — H46.9 OPTIC NEUROPATHY, LEFT: ICD-10-CM

## 2020-01-08 DIAGNOSIS — H52.03 HYPEROPIA WITH PRESBYOPIA OF BOTH EYES: ICD-10-CM

## 2020-01-08 DIAGNOSIS — H40.003 GLAUCOMA SUSPECT OF BOTH EYES: Primary | ICD-10-CM

## 2020-01-08 PROCEDURE — 92015 DETERMINE REFRACTIVE STATE: CPT | Performed by: OPHTHALMOLOGY

## 2020-01-08 PROCEDURE — 92014 COMPRE OPH EXAM EST PT 1/>: CPT | Performed by: OPHTHALMOLOGY

## 2020-01-08 NOTE — PROGRESS NOTES
Jorge Mckoy is a 44year old female. HPI:     HPI     Patient is here for a complete exam.  She states her vision is good. She has no ocular complaints.       Last edited by Devendra Ramsey OT on 1/8/2020  2:21 PM. (History)        Patient History: Vitamins-Minerals (WOMENS MULTIVITAMIN PLUS) Oral Tab Take  by mouth daily. • prednisoLONE 5 MG Oral Tab Take 5 mg by mouth daily.  Taper dose         Allergies:    Amoxicillin             HIVES  Penicillins             HIVES    ROS:       PHYSICAL EXAM Sphere Cylinder Dist [place]    Right +6.50 Sphere  20/20    Left +3.50 Sphere  20/20    Type:  Reading only                 ASSESSMENT/PLAN:     Diagnoses and Plan:     Glaucoma suspect  Patient is a glaucoma suspect due to increased cupping of the opt

## 2020-09-08 ENCOUNTER — TELEPHONE (OUTPATIENT)
Dept: INTERNAL MEDICINE CLINIC | Facility: CLINIC | Age: 50
End: 2020-09-08

## 2020-09-08 ENCOUNTER — TELEMEDICINE (OUTPATIENT)
Dept: INTERNAL MEDICINE CLINIC | Facility: CLINIC | Age: 50
End: 2020-09-08
Payer: COMMERCIAL

## 2020-09-08 DIAGNOSIS — M25.649 JOINT STIFFNESS OF HAND, UNSPECIFIED LATERALITY: Primary | ICD-10-CM

## 2020-09-08 DIAGNOSIS — G56.01 CARPAL TUNNEL SYNDROME OF RIGHT WRIST: ICD-10-CM

## 2020-09-08 DIAGNOSIS — M19.041 OSTEOARTHRITIS OF BOTH HANDS, UNSPECIFIED OSTEOARTHRITIS TYPE: Primary | ICD-10-CM

## 2020-09-08 DIAGNOSIS — M19.042 OSTEOARTHRITIS OF BOTH HANDS, UNSPECIFIED OSTEOARTHRITIS TYPE: Primary | ICD-10-CM

## 2020-09-08 DIAGNOSIS — L98.9 SKIN LESION OF FOOT: ICD-10-CM

## 2020-09-08 PROCEDURE — 99214 OFFICE O/P EST MOD 30 MIN: CPT | Performed by: INTERNAL MEDICINE

## 2020-09-08 RX ORDER — IBUPROFEN 200 MG
200 TABLET ORAL EVERY 6 HOURS PRN
Qty: 60 TABLET | Refills: 0 | Status: SHIPPED | OUTPATIENT
Start: 2020-09-08 | End: 2021-01-07

## 2020-09-08 RX ORDER — IBUPROFEN 200 MG
200 TABLET ORAL DAILY PRN
Qty: 30 TABLET | Refills: 0 | Status: CANCELLED | OUTPATIENT
Start: 2020-09-08 | End: 2020-10-08

## 2020-09-08 NOTE — PROGRESS NOTES
Telephone Check-In    John Serrano verbally consents to a Virtual/Telephone Check-In service on 09/08/20. Patient understands and accepts financial responsibility for any deductible, co-insurance and/or co-pays associated with this service.     Duration medial/lateral aspect. It is described as a callus-like spot that is tender to the touch. May have gotten bigger, darker. Has not tried anything for it. Believes that this may be friction related to her footwear.     Of note, patient requested a telemed peripherals    #Left foot skin lesion  -Difficult to observe via telemedicine  –Suspect may be related to chronic inflammation from friction of the surrounding area  PLAN  -Advised the patient put barrier treatment over area to prevent further friction and [facility] website. The patient verbally agreed to telehealth consent form, related consents and the risks discussed. Lastly, the patient confirmed that they were in [place].    Included in this visit, time may have been spent reviewing labs, medications, radi

## 2020-09-09 ENCOUNTER — HOSPITAL ENCOUNTER (OUTPATIENT)
Dept: MAMMOGRAPHY | Age: 50
Discharge: HOME | End: 2020-09-09
Attending: OBSTETRICS & GYNECOLOGY
Payer: COMMERCIAL

## 2020-09-09 DIAGNOSIS — Z12.31 BREAST CANCER SCREENING BY MAMMOGRAM: ICD-10-CM

## 2020-09-09 PROCEDURE — 77067 SCR MAMMO BI INCL CAD: CPT | Performed by: OBSTETRICS & GYNECOLOGY

## 2020-09-09 PROCEDURE — 77063 BREAST TOMOSYNTHESIS BI: CPT | Performed by: OBSTETRICS & GYNECOLOGY

## 2021-01-07 ENCOUNTER — LAB ENCOUNTER (OUTPATIENT)
Dept: LAB | Age: 51
End: 2021-01-07
Attending: INTERNAL MEDICINE
Payer: COMMERCIAL

## 2021-01-07 ENCOUNTER — OFFICE VISIT (OUTPATIENT)
Dept: INTERNAL MEDICINE CLINIC | Facility: CLINIC | Age: 51
End: 2021-01-07
Payer: COMMERCIAL

## 2021-01-07 VITALS
BODY MASS INDEX: 23 KG/M2 | TEMPERATURE: 99 F | SYSTOLIC BLOOD PRESSURE: 134 MMHG | DIASTOLIC BLOOD PRESSURE: 90 MMHG | HEART RATE: 68 BPM | WEIGHT: 149 LBS

## 2021-01-07 DIAGNOSIS — M72.2 PLANTAR FASCIITIS OF RIGHT FOOT: ICD-10-CM

## 2021-01-07 DIAGNOSIS — M25.50 POLYARTHRALGIA: ICD-10-CM

## 2021-01-07 DIAGNOSIS — M25.50 POLYARTHRALGIA: Primary | ICD-10-CM

## 2021-01-07 LAB
CRP SERPL-MCNC: <0.29 MG/DL (ref ?–0.3)
ERYTHROCYTE [SEDIMENTATION RATE] IN BLOOD: 14 MM/HR
RHEUMATOID FACT SERPL-ACNC: <10 IU/ML (ref ?–15)

## 2021-01-07 PROCEDURE — 86200 CCP ANTIBODY: CPT

## 2021-01-07 PROCEDURE — 86038 ANTINUCLEAR ANTIBODIES: CPT

## 2021-01-07 PROCEDURE — 3080F DIAST BP >= 90 MM HG: CPT | Performed by: INTERNAL MEDICINE

## 2021-01-07 PROCEDURE — 86431 RHEUMATOID FACTOR QUANT: CPT

## 2021-01-07 PROCEDURE — 86140 C-REACTIVE PROTEIN: CPT

## 2021-01-07 PROCEDURE — 3075F SYST BP GE 130 - 139MM HG: CPT | Performed by: INTERNAL MEDICINE

## 2021-01-07 PROCEDURE — 86039 ANTINUCLEAR ANTIBODIES (ANA): CPT

## 2021-01-07 PROCEDURE — 36415 COLL VENOUS BLD VENIPUNCTURE: CPT

## 2021-01-07 PROCEDURE — 85652 RBC SED RATE AUTOMATED: CPT

## 2021-01-07 PROCEDURE — 99214 OFFICE O/P EST MOD 30 MIN: CPT | Performed by: INTERNAL MEDICINE

## 2021-01-07 RX ORDER — MELOXICAM 7.5 MG/1
7.5 TABLET ORAL DAILY
Qty: 30 TABLET | Refills: 1 | Status: SHIPPED | OUTPATIENT
Start: 2021-01-07 | End: 2021-02-25
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
## 2021-01-08 LAB — CCP IGG SERPL-ACNC: 1.3 U/ML (ref 0–6.9)

## 2021-01-11 LAB — NUCLEAR IGG TITR SER IF: POSITIVE {TITER}

## 2021-01-13 LAB — ANA NUCLEOLAR TITR SER IF: 80 {TITER}

## 2021-01-14 ENCOUNTER — OFFICE VISIT (OUTPATIENT)
Dept: OPHTHALMOLOGY | Facility: CLINIC | Age: 51
End: 2021-01-14
Payer: COMMERCIAL

## 2021-01-14 DIAGNOSIS — H52.4 HYPEROPIA WITH PRESBYOPIA OF BOTH EYES: ICD-10-CM

## 2021-01-14 DIAGNOSIS — H40.003 GLAUCOMA SUSPECT OF BOTH EYES: Primary | ICD-10-CM

## 2021-01-14 DIAGNOSIS — H52.03 HYPEROPIA WITH PRESBYOPIA OF BOTH EYES: ICD-10-CM

## 2021-01-14 DIAGNOSIS — H46.9 OPTIC NEUROPATHY, LEFT: ICD-10-CM

## 2021-01-14 PROCEDURE — 92014 COMPRE OPH EXAM EST PT 1/>: CPT | Performed by: OPHTHALMOLOGY

## 2021-01-14 PROCEDURE — 92015 DETERMINE REFRACTIVE STATE: CPT | Performed by: OPHTHALMOLOGY

## 2021-01-14 PROCEDURE — 92250 FUNDUS PHOTOGRAPHY W/I&R: CPT | Performed by: OPHTHALMOLOGY
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
## 2021-01-14 NOTE — PROGRESS NOTES
Portia Fox is a 44year old female. HPI:     HPI     Pt in today for a complete eye exam and photos. Pt states vision is stable and denies any ocular issues.      Last edited by Shelbi García OT on 1/14/2021 11:07 AM. (History)        Patient Hi months     • Cabergoline 0.5 MG Oral Tab Take 0.5 tablets by mouth once a week. 1   • Multiple Vitamins-Minerals (WOMENS MULTIVITAMIN PLUS) Oral Tab Take  by mouth daily.          Allergies:    Amoxicillin             HIVES  Penicillins             HIVES 20/25+     Type: Distance only          Final Rx #2       Sphere Cylinder Dist VA Near [place]    Right +6.50 Sphere  20/20    Left +3.75 Sphere  20/20    Type: Reading only                 ASSESSMENT/PLAN:     Diagnoses and Plan:     Glaucoma suspect  Discusse
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
## 2021-01-14 NOTE — ASSESSMENT & PLAN NOTE
New glasses today; suggest update. RX for separate distance and reading glasses per patient's choice.

## 2021-01-16 ENCOUNTER — NURSE ONLY (OUTPATIENT)
Dept: OPHTHALMOLOGY | Facility: CLINIC | Age: 51
End: 2021-01-16
Payer: COMMERCIAL

## 2021-01-16 DIAGNOSIS — H40.003 GLAUCOMA SUSPECT OF BOTH EYES: ICD-10-CM

## 2021-01-16 PROCEDURE — 92133 CPTRZD OPH DX IMG PST SGM ON: CPT | Performed by: OPHTHALMOLOGY

## 2021-01-16 PROCEDURE — 92083 EXTENDED VISUAL FIELD XM: CPT | Performed by: OPHTHALMOLOGY

## 2021-01-17 NOTE — PROGRESS NOTES
Sandy Sherman is a 44year old female. HPI:     HPI     Patient is here for a glaucoma work up with HVF and OCT, no M.D.     Last edited by Jayden March on 1/16/2021  9:48 AM. (History)        Patient History:  Past Medical History:   Diagnosis Date 0.5 tablets by mouth once a week. 1   • Multiple Vitamins-Minerals (WOMENS MULTIVITAMIN PLUS) Oral Tab Take  by mouth daily.          Allergies:    Amoxicillin             HIVES  Penicillins             HIVES    ROS:       PHYSICAL EXAM:      Not recorded

## 2021-02-09 ENCOUNTER — OFFICE VISIT (OUTPATIENT)
Dept: INTERNAL MEDICINE CLINIC | Facility: CLINIC | Age: 51
End: 2021-02-09
Payer: COMMERCIAL

## 2021-02-09 ENCOUNTER — LAB ENCOUNTER (OUTPATIENT)
Dept: LAB | Age: 51
End: 2021-02-09
Attending: INTERNAL MEDICINE
Payer: COMMERCIAL

## 2021-02-09 VITALS
HEART RATE: 109 BPM | HEIGHT: 67 IN | WEIGHT: 163 LBS | BODY MASS INDEX: 25.58 KG/M2 | TEMPERATURE: 99 F | DIASTOLIC BLOOD PRESSURE: 92 MMHG | SYSTOLIC BLOOD PRESSURE: 142 MMHG | OXYGEN SATURATION: 100 %

## 2021-02-09 DIAGNOSIS — R19.02 ABDOMINAL WALL MASS OF LEFT UPPER QUADRANT: ICD-10-CM

## 2021-02-09 DIAGNOSIS — R76.8 POSITIVE ANA (ANTINUCLEAR ANTIBODY): ICD-10-CM

## 2021-02-09 DIAGNOSIS — R76.8 POSITIVE ANA (ANTINUCLEAR ANTIBODY): Primary | ICD-10-CM

## 2021-02-09 DIAGNOSIS — M79.641 NON-ARTICULAR PAIN OF RIGHT HAND: ICD-10-CM

## 2021-02-09 LAB
C3 SERPL-MCNC: 85.8 MG/DL (ref 90–180)
C4 SERPL-MCNC: 27.8 MG/DL (ref 10–40)

## 2021-02-09 PROCEDURE — 3077F SYST BP >= 140 MM HG: CPT | Performed by: INTERNAL MEDICINE

## 2021-02-09 PROCEDURE — 99213 OFFICE O/P EST LOW 20 MIN: CPT | Performed by: INTERNAL MEDICINE

## 2021-02-09 PROCEDURE — 86235 NUCLEAR ANTIGEN ANTIBODY: CPT

## 2021-02-09 PROCEDURE — 86225 DNA ANTIBODY NATIVE: CPT

## 2021-02-09 PROCEDURE — 86160 COMPLEMENT ANTIGEN: CPT

## 2021-02-09 PROCEDURE — 3008F BODY MASS INDEX DOCD: CPT | Performed by: INTERNAL MEDICINE

## 2021-02-09 PROCEDURE — 3080F DIAST BP >= 90 MM HG: CPT | Performed by: INTERNAL MEDICINE

## 2021-02-09 PROCEDURE — 36415 COLL VENOUS BLD VENIPUNCTURE: CPT

## 2021-02-09 NOTE — PROGRESS NOTES
Jahaira Salmon is a 44year old female. Patient presents with:  Lump: Noticed lump LUQ of abdomen in July     HPI:     Here for f/u of R hand stiffness/pain as well as an abdominal lump.     R hand stiffness since 9/2020 -- initially woke up one morning wit Smoker      Smokeless tobacco: Never Used    Alcohol use: No    Drug use: No       REVIEW OF SYSTEMS:   GENERAL HEALTH: feels well otherwise  RESPIRATORY: no SOB  CARDIOVASCULAR: no chest pain/pressure  GI: no nausea, vomiting, diarrhea    Wt Readings from

## 2021-02-12 LAB — DSDNA AB TITR SER: <10 {TITER}

## 2021-02-16 LAB
ENA SM IGG SER QL: NEGATIVE
ENA SM+RNP AB SER QL: NEGATIVE

## 2021-02-18 ENCOUNTER — HOSPITAL ENCOUNTER (OUTPATIENT)
Dept: GENERAL RADIOLOGY | Facility: HOSPITAL | Age: 51
Discharge: HOME | End: 2021-02-18
Attending: INTERNAL MEDICINE
Payer: COMMERCIAL

## 2021-02-18 DIAGNOSIS — M79.641 NON-ARTICULAR PAIN OF RIGHT HAND: ICD-10-CM

## 2021-02-18 PROCEDURE — 73130 X-RAY EXAM OF HAND: CPT | Performed by: INTERNAL MEDICINE
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
## 2021-02-21 NOTE — PATIENT INSTRUCTIONS
- You were seen in clinic for regular annual check-up.  We have ordered labs for you and we will call you with the results.  -We have refilled your cabergoline, please follow-up with your new endocrinologist Dr. Hilary Ortega for your para thyroid and pituitary beatris

## 2021-02-22 ENCOUNTER — HOSPITAL ENCOUNTER (OUTPATIENT)
Dept: ULTRASOUND IMAGING | Age: 51
Discharge: HOME | End: 2021-02-22
Attending: INTERNAL MEDICINE
Payer: COMMERCIAL

## 2021-02-22 DIAGNOSIS — R19.02 ABDOMINAL WALL MASS OF LEFT UPPER QUADRANT: ICD-10-CM

## 2021-02-22 PROCEDURE — 76705 ECHO EXAM OF ABDOMEN: CPT | Performed by: INTERNAL MEDICINE

## 2021-02-24 ENCOUNTER — PATIENT MESSAGE (OUTPATIENT)
Dept: INTERNAL MEDICINE CLINIC | Facility: CLINIC | Age: 51
End: 2021-02-24

## 2021-02-25 ENCOUNTER — OFFICE VISIT (OUTPATIENT)
Dept: INTERNAL MEDICINE CLINIC | Facility: CLINIC | Age: 51
End: 2021-02-25
Payer: COMMERCIAL

## 2021-02-25 ENCOUNTER — LAB ENCOUNTER (OUTPATIENT)
Dept: LAB | Age: 51
End: 2021-02-25
Attending: INTERNAL MEDICINE
Payer: COMMERCIAL

## 2021-02-25 VITALS
BODY MASS INDEX: 25.11 KG/M2 | TEMPERATURE: 98 F | HEART RATE: 76 BPM | SYSTOLIC BLOOD PRESSURE: 144 MMHG | WEIGHT: 160 LBS | DIASTOLIC BLOOD PRESSURE: 84 MMHG | HEIGHT: 67 IN

## 2021-02-25 DIAGNOSIS — Z13.0 SCREENING FOR IRON DEFICIENCY ANEMIA: ICD-10-CM

## 2021-02-25 DIAGNOSIS — D35.2 PITUITARY ADENOMA (HCC): ICD-10-CM

## 2021-02-25 DIAGNOSIS — R22.1 SUBCUTANEOUS NODULE OF NECK: ICD-10-CM

## 2021-02-25 DIAGNOSIS — E55.9 VITAMIN D DEFICIENCY: ICD-10-CM

## 2021-02-25 DIAGNOSIS — H46.9 OPTIC NEUROPATHY, LEFT: ICD-10-CM

## 2021-02-25 DIAGNOSIS — Z13.29 SCREENING FOR THYROID DISORDER: ICD-10-CM

## 2021-02-25 DIAGNOSIS — Z13.220 SCREENING FOR LIPOID DISORDERS: ICD-10-CM

## 2021-02-25 DIAGNOSIS — M25.50 POLYARTHRALGIA: ICD-10-CM

## 2021-02-25 DIAGNOSIS — Z00.00 ROUTINE GENERAL MEDICAL EXAMINATION AT A HEALTH CARE FACILITY: Primary | ICD-10-CM

## 2021-02-25 DIAGNOSIS — Z00.00 ROUTINE GENERAL MEDICAL EXAMINATION AT A HEALTH CARE FACILITY: ICD-10-CM

## 2021-02-25 DIAGNOSIS — R76.8 POSITIVE ANA (ANTINUCLEAR ANTIBODY): ICD-10-CM

## 2021-02-25 DIAGNOSIS — D17.1 LIPOMA OF SKIN OF ABDOMEN: ICD-10-CM

## 2021-02-25 DIAGNOSIS — Z00.00 ANNUAL PHYSICAL EXAM: ICD-10-CM

## 2021-02-25 LAB
ALBUMIN SERPL-MCNC: 3.8 G/DL (ref 3.4–5)
ALBUMIN/GLOB SERPL: 0.8 {RATIO} (ref 1–2)
ALP LIVER SERPL-CCNC: 71 U/L
ALT SERPL-CCNC: 22 U/L
ANION GAP SERPL CALC-SCNC: 3 MMOL/L (ref 0–18)
AST SERPL-CCNC: 19 U/L (ref 15–37)
BASOPHILS # BLD AUTO: 0.02 X10(3) UL (ref 0–0.2)
BASOPHILS NFR BLD AUTO: 0.5 %
BILIRUB SERPL-MCNC: 0.6 MG/DL (ref 0.1–2)
BUN BLD-MCNC: 12 MG/DL (ref 7–18)
BUN/CREAT SERPL: 13.5 (ref 10–20)
CALCIUM BLD-MCNC: 10.2 MG/DL (ref 8.5–10.1)
CHLORIDE SERPL-SCNC: 106 MMOL/L (ref 98–112)
CHOLEST SMN-MCNC: 187 MG/DL (ref ?–200)
CO2 SERPL-SCNC: 27 MMOL/L (ref 21–32)
CREAT BLD-MCNC: 0.89 MG/DL
DEPRECATED RDW RBC AUTO: 45.9 FL (ref 35.1–46.3)
EOSINOPHIL # BLD AUTO: 0.08 X10(3) UL (ref 0–0.7)
EOSINOPHIL NFR BLD AUTO: 1.9 %
ERYTHROCYTE [DISTWIDTH] IN BLOOD BY AUTOMATED COUNT: 13 % (ref 11–15)
GLOBULIN PLAS-MCNC: 4.6 G/DL (ref 2.8–4.4)
GLUCOSE BLD-MCNC: 89 MG/DL (ref 70–99)
HCT VFR BLD AUTO: 43.8 %
HDLC SERPL-MCNC: 72 MG/DL (ref 40–59)
HGB BLD-MCNC: 14.7 G/DL
IMM GRANULOCYTES # BLD AUTO: 0 X10(3) UL (ref 0–1)
IMM GRANULOCYTES NFR BLD: 0 %
LDLC SERPL CALC-MCNC: 102 MG/DL (ref ?–100)
LYMPHOCYTES # BLD AUTO: 1.77 X10(3) UL (ref 1–4)
LYMPHOCYTES NFR BLD AUTO: 43.1 %
M PROTEIN MFR SERPL ELPH: 8.4 G/DL (ref 6.4–8.2)
MCH RBC QN AUTO: 32 PG (ref 26–34)
MCHC RBC AUTO-ENTMCNC: 33.6 G/DL (ref 31–37)
MCV RBC AUTO: 95.4 FL
MONOCYTES # BLD AUTO: 0.25 X10(3) UL (ref 0.1–1)
MONOCYTES NFR BLD AUTO: 6.1 %
NEUTROPHILS # BLD AUTO: 1.99 X10 (3) UL (ref 1.5–7.7)
NEUTROPHILS # BLD AUTO: 1.99 X10(3) UL (ref 1.5–7.7)
NEUTROPHILS NFR BLD AUTO: 48.4 %
NONHDLC SERPL-MCNC: 115 MG/DL (ref ?–130)
OSMOLALITY SERPL CALC.SUM OF ELEC: 281 MOSM/KG (ref 275–295)
PATIENT FASTING Y/N/NP: YES
PATIENT FASTING Y/N/NP: YES
PLATELET # BLD AUTO: 157 10(3)UL (ref 150–450)
POTASSIUM SERPL-SCNC: 4.3 MMOL/L (ref 3.5–5.1)
PROLACTIN SERPL-MCNC: 24 NG/ML
RBC # BLD AUTO: 4.59 X10(6)UL
SODIUM SERPL-SCNC: 136 MMOL/L (ref 136–145)
TRIGL SERPL-MCNC: 65 MG/DL (ref 30–149)
TSI SER-ACNC: 1.18 MIU/ML (ref 0.36–3.74)
VLDLC SERPL CALC-MCNC: 13 MG/DL (ref 0–30)
WBC # BLD AUTO: 4.1 X10(3) UL (ref 4–11)

## 2021-02-25 PROCEDURE — 85025 COMPLETE CBC W/AUTO DIFF WBC: CPT

## 2021-02-25 PROCEDURE — 3008F BODY MASS INDEX DOCD: CPT | Performed by: INTERNAL MEDICINE

## 2021-02-25 PROCEDURE — 3079F DIAST BP 80-89 MM HG: CPT | Performed by: INTERNAL MEDICINE

## 2021-02-25 PROCEDURE — 99396 PREV VISIT EST AGE 40-64: CPT | Performed by: INTERNAL MEDICINE

## 2021-02-25 PROCEDURE — 80053 COMPREHEN METABOLIC PANEL: CPT

## 2021-02-25 PROCEDURE — 84146 ASSAY OF PROLACTIN: CPT

## 2021-02-25 PROCEDURE — 82306 VITAMIN D 25 HYDROXY: CPT

## 2021-02-25 PROCEDURE — 36415 COLL VENOUS BLD VENIPUNCTURE: CPT

## 2021-02-25 PROCEDURE — 80061 LIPID PANEL: CPT

## 2021-02-25 PROCEDURE — 84443 ASSAY THYROID STIM HORMONE: CPT | Performed by: INTERNAL MEDICINE

## 2021-02-25 PROCEDURE — 3077F SYST BP >= 140 MM HG: CPT | Performed by: INTERNAL MEDICINE

## 2021-02-25 RX ORDER — CABERGOLINE 0.5 MG/1
0.25 TABLET ORAL WEEKLY
Qty: 6 TABLET | Refills: 3 | Status: SHIPPED | OUTPATIENT
Start: 2021-02-25 | End: 2021-11-10

## 2021-02-25 RX ORDER — FLUTICASONE PROPIONATE 50 MCG
2 SPRAY, SUSPENSION (ML) NASAL DAILY
Qty: 3 BOTTLE | Refills: 3 | Status: SHIPPED | OUTPATIENT
Start: 2021-02-25 | End: 2021-06-08

## 2021-02-26 LAB — 25(OH)D3 SERPL-MCNC: 21.5 NG/ML (ref 30–100)

## 2021-02-28 ENCOUNTER — TELEPHONE (OUTPATIENT)
Dept: INTERNAL MEDICINE CLINIC | Facility: CLINIC | Age: 51
End: 2021-02-28

## 2021-03-01 NOTE — TELEPHONE ENCOUNTER
Please notify the patient that I reviewed her blood work from 2/25/2021    Calcium levels are slightly on the higher side at 10.2    Lipid panel: HDL/good cholesterol is excellent at 72, LDL/bad cholesterol slightly high at 102.      Prolactin levels look g

## 2021-03-09 PROBLEM — E83.52 HYPERCALCEMIA: Status: ACTIVE | Noted: 2021-03-09

## 2021-03-10 ENCOUNTER — PATIENT MESSAGE (OUTPATIENT)
Dept: INTERNAL MEDICINE CLINIC | Facility: CLINIC | Age: 51
End: 2021-03-10

## 2021-03-10 NOTE — TELEPHONE ENCOUNTER
From: Viraj Richey  To: Xavier Myers MD  Sent: 3/10/2021 1:57 PM CST  Subject: Test Results Marce Mendoza Handler,    The test results for my Vitamin D level is 21.5 ng/mL which is low.  Can you tell me how much Vitamin D I should intake daily/we

## 2021-03-15 ENCOUNTER — HOSPITAL ENCOUNTER (OUTPATIENT)
Dept: ULTRASOUND IMAGING | Facility: HOSPITAL | Age: 51
Discharge: HOME | End: 2021-03-15
Attending: INTERNAL MEDICINE
Payer: COMMERCIAL

## 2021-03-15 ENCOUNTER — TELEPHONE (OUTPATIENT)
Dept: INTERNAL MEDICINE CLINIC | Facility: CLINIC | Age: 51
End: 2021-03-15

## 2021-03-15 DIAGNOSIS — R22.1 SUBCUTANEOUS NODULE OF NECK: ICD-10-CM

## 2021-03-15 DIAGNOSIS — R59.0 CERVICAL LYMPHADENOPATHY: Primary | ICD-10-CM

## 2021-03-15 PROCEDURE — 76536 US EXAM OF HEAD AND NECK: CPT | Performed by: INTERNAL MEDICINE

## 2021-03-15 NOTE — TELEPHONE ENCOUNTER
I reviewed the ultrasound the head neck 3/15/2021:    Bilateral lymph nodes left cervical lymph node 1.3 x 0.6 x 1.2 cm.   There is an adjacent left-sided cervical lymph node measuring 0.7 x 0.4 x 0.7 cm    Right neck 1.2 x 0.3 x 1 cm right-sided cervical l

## 2021-03-16 ENCOUNTER — TELEPHONE (OUTPATIENT)
Dept: OPHTHALMOLOGY | Facility: CLINIC | Age: 51
End: 2021-03-16

## 2021-03-16 NOTE — TELEPHONE ENCOUNTER
Spoke to patient, let her know that we do not usually do PD measurements. The optical shop will do that when you order glasses. Pt understands.

## 2021-03-20 ENCOUNTER — HOSPITAL ENCOUNTER (OUTPATIENT)
Dept: CT IMAGING | Facility: HOSPITAL | Age: 51
Discharge: HOME | End: 2021-03-20
Attending: INTERNAL MEDICINE
Payer: COMMERCIAL

## 2021-03-20 DIAGNOSIS — R59.0 CERVICAL LYMPHADENOPATHY: ICD-10-CM

## 2021-03-20 PROCEDURE — 70491 CT SOFT TISSUE NECK W/DYE: CPT | Performed by: INTERNAL MEDICINE

## 2021-03-22 ENCOUNTER — TELEPHONE (OUTPATIENT)
Dept: INTERNAL MEDICINE CLINIC | Facility: CLINIC | Age: 51
End: 2021-03-22

## 2021-03-22 DIAGNOSIS — R91.1 PULMONARY NODULE LESS THAN 6 MM IN DIAMETER WITH LOW RISK FOR MALIGNANT NEOPLASM: ICD-10-CM

## 2021-03-22 DIAGNOSIS — Z91.89 PULMONARY NODULE LESS THAN 6 MM IN DIAMETER WITH LOW RISK FOR MALIGNANT NEOPLASM: ICD-10-CM

## 2021-03-22 DIAGNOSIS — R93.89 ABNORMAL CT SCAN, NECK: Primary | ICD-10-CM

## 2021-03-22 DIAGNOSIS — M41.80 DEXTROSCOLIOSIS: ICD-10-CM

## 2021-03-22 NOTE — TELEPHONE ENCOUNTER
Reviewed the CT neck from 3/20/2021    Upper neck posterior area adjacent to the right posterior paraspinous musculature at C1-C2 levels, there is a heterogeneous density 2.6 x 1.8 x 3.0 cm subcutaneous lesion with staples intrinsic enhancement.   Josefa Gilmore

## 2021-04-05 ENCOUNTER — HOSPITAL ENCOUNTER (OUTPATIENT)
Dept: GENERAL RADIOLOGY | Facility: HOSPITAL | Age: 51
Discharge: HOME | End: 2021-04-05
Attending: INTERNAL MEDICINE
Payer: COMMERCIAL

## 2021-04-05 ENCOUNTER — TELEPHONE (OUTPATIENT)
Dept: INTERNAL MEDICINE CLINIC | Facility: CLINIC | Age: 51
End: 2021-04-05

## 2021-04-05 ENCOUNTER — OFFICE VISIT (OUTPATIENT)
Dept: RHEUMATOLOGY | Facility: CLINIC | Age: 51
End: 2021-04-05
Payer: COMMERCIAL

## 2021-04-05 ENCOUNTER — LAB ENCOUNTER (OUTPATIENT)
Dept: LAB | Facility: HOSPITAL | Age: 51
End: 2021-04-05
Attending: INTERNAL MEDICINE
Payer: COMMERCIAL

## 2021-04-05 VITALS
SYSTOLIC BLOOD PRESSURE: 136 MMHG | WEIGHT: 159.69 LBS | HEART RATE: 94 BPM | DIASTOLIC BLOOD PRESSURE: 91 MMHG | HEIGHT: 67 IN | BODY MASS INDEX: 25.06 KG/M2

## 2021-04-05 DIAGNOSIS — R76.8 POSITIVE ANA (ANTINUCLEAR ANTIBODY): ICD-10-CM

## 2021-04-05 DIAGNOSIS — M79.641 BILATERAL HAND PAIN: ICD-10-CM

## 2021-04-05 DIAGNOSIS — M41.80 DEXTROSCOLIOSIS: ICD-10-CM

## 2021-04-05 DIAGNOSIS — M41.80 DEXTROSCOLIOSIS: Primary | ICD-10-CM

## 2021-04-05 DIAGNOSIS — M79.642 BILATERAL HAND PAIN: ICD-10-CM

## 2021-04-05 DIAGNOSIS — R76.8 POSITIVE ANA (ANTINUCLEAR ANTIBODY): Primary | ICD-10-CM

## 2021-04-05 PROCEDURE — 86235 NUCLEAR ANTIGEN ANTIBODY: CPT

## 2021-04-05 PROCEDURE — 86225 DNA ANTIBODY NATIVE: CPT

## 2021-04-05 PROCEDURE — 99244 OFF/OP CNSLTJ NEW/EST MOD 40: CPT | Performed by: INTERNAL MEDICINE

## 2021-04-05 PROCEDURE — 3075F SYST BP GE 130 - 139MM HG: CPT | Performed by: INTERNAL MEDICINE

## 2021-04-05 PROCEDURE — 36415 COLL VENOUS BLD VENIPUNCTURE: CPT

## 2021-04-05 PROCEDURE — 3080F DIAST BP >= 90 MM HG: CPT | Performed by: INTERNAL MEDICINE

## 2021-04-05 PROCEDURE — 3008F BODY MASS INDEX DOCD: CPT | Performed by: INTERNAL MEDICINE

## 2021-04-05 PROCEDURE — 72082 X-RAY EXAM ENTIRE SPI 2/3 VW: CPT | Performed by: INTERNAL MEDICINE

## 2021-04-05 RX ORDER — ERGOCALCIFEROL 1.25 MG/1
CAPSULE ORAL
Qty: 12 CAPSULE | Refills: 0 | Status: SHIPPED | OUTPATIENT
Start: 2021-04-05 | End: 2021-11-10
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
## 2021-04-05 NOTE — TELEPHONE ENCOUNTER
Reviewed x-ray from 4/5/2021    There is mild dextroscoliosis noted to thoracolumbar spine with apex centered at the L1 level, 18.4 degrees from the superior T11 endplate to the inferior L3 endplate.   There is 2.4 cm left superior pelvic tilt    She remain

## 2021-04-08 ENCOUNTER — OFFICE VISIT (OUTPATIENT)
Dept: OTOLARYNGOLOGY | Facility: CLINIC | Age: 51
End: 2021-04-08
Payer: COMMERCIAL

## 2021-04-08 ENCOUNTER — HOSPITAL ENCOUNTER (OUTPATIENT)
Dept: GENERAL RADIOLOGY | Facility: HOSPITAL | Age: 51
Discharge: HOME | End: 2021-04-08
Attending: OTOLARYNGOLOGY
Payer: COMMERCIAL

## 2021-04-08 VITALS
SYSTOLIC BLOOD PRESSURE: 133 MMHG | WEIGHT: 159 LBS | DIASTOLIC BLOOD PRESSURE: 80 MMHG | BODY MASS INDEX: 24.96 KG/M2 | HEIGHT: 67 IN | TEMPERATURE: 97 F

## 2021-04-08 DIAGNOSIS — R59.0 LYMPHADENOPATHY, SUPRACLAVICULAR: ICD-10-CM

## 2021-04-08 DIAGNOSIS — R22.1 MASS OF RIGHT SIDE OF NECK: Primary | ICD-10-CM

## 2021-04-08 PROCEDURE — 3075F SYST BP GE 130 - 139MM HG: CPT | Performed by: OTOLARYNGOLOGY

## 2021-04-08 PROCEDURE — 71046 X-RAY EXAM CHEST 2 VIEWS: CPT | Performed by: OTOLARYNGOLOGY

## 2021-04-08 PROCEDURE — 3079F DIAST BP 80-89 MM HG: CPT | Performed by: OTOLARYNGOLOGY

## 2021-04-08 PROCEDURE — 3008F BODY MASS INDEX DOCD: CPT | Performed by: OTOLARYNGOLOGY

## 2021-04-08 PROCEDURE — 99243 OFF/OP CNSLTJ NEW/EST LOW 30: CPT | Performed by: OTOLARYNGOLOGY

## 2021-04-09 NOTE — PROGRESS NOTES
Romana Chambers is a 46year old female. Patient presents with:  Lymph Node: c/o enlarged lymph nodes for  awhile, CT of the scan neck done on 3/20/21      HISTORY OF PRESENT ILLNESS  She presents with incidental finding of lymph nodes.   She states that s changes in posterior pole   • Pituitary adenoma ([facility] [address] 2010   • Warts 2012 1st; debridement, dispensed 12% salicylic acid       Past Surgical History:   Procedure Laterality Date   • APPENDECTOMY     • CARDIAC SURG PROCEDURE UNLIST      per NG: heart sx affect. Neck Exam Normal Inspection - Normal. Palpation -palpable mass in the right neck superiorly. Firm to palpation.   Parotid gland - Normal. Thyroid gland - Normal.   Eyes Normal Conjunctiva - Right: Normal, Left: Normal. Pupil - Right: Normal, Left the results of her ultrasound and CT scan.   I did discuss with her that I did not feel that the lymphadenopathy is of any real significance by the disc shanna with her that she has a 3 cm mass on the right subcutaneously along the paraspinous musculature at

## 2021-04-13 ENCOUNTER — TELEPHONE (OUTPATIENT)
Dept: RHEUMATOLOGY | Facility: CLINIC | Age: 51
End: 2021-04-13

## 2021-04-13 NOTE — TELEPHONE ENCOUNTER
She is out exercising. Her specific antibodies came back negative to double-stranded DNA, Begum, RNP, SSA, and SSB. As expected, her borderline negative TREE is not clinically significant. She was reassured.

## 2021-04-23 NOTE — ED INITIAL ASSESSMENT (HPI)
Arrives with c/o chills, body aches, fever for three days. States temp was as high as 103 at home. MD prescribed Levaquin for a UTI today. Patient took one dose. Reports the Tylenol only helps for 3.5 hours. Chief Complaint   Patient presents with    Dizziness     pt states has had problems with hypotension recently, c/o dizziness and weakness, lack of appetite

## 2021-04-30 ENCOUNTER — HOSPITAL ENCOUNTER (OUTPATIENT)
Dept: MRI IMAGING | Facility: HOSPITAL | Age: 51
Discharge: HOME | End: 2021-04-30
Attending: OTOLARYNGOLOGY
Payer: COMMERCIAL

## 2021-04-30 DIAGNOSIS — R22.1 MASS OF RIGHT SIDE OF NECK: ICD-10-CM

## 2021-04-30 PROCEDURE — A9575 INJ GADOTERATE MEGLUMI 0.1ML: HCPCS | Performed by: OTOLARYNGOLOGY

## 2021-04-30 PROCEDURE — 70543 MRI ORBT/FAC/NCK W/O &W/DYE: CPT | Performed by: OTOLARYNGOLOGY

## 2021-05-11 ENCOUNTER — ORDER TRANSCRIPTION (OUTPATIENT)
Dept: ADMINISTRATIVE | Facility: HOSPITAL | Age: 51
End: 2021-05-11

## 2021-05-11 DIAGNOSIS — Z01.818 PREOP EXAMINATION: Primary | ICD-10-CM

## 2021-05-11 DIAGNOSIS — Z11.59 ENCOUNTER FOR SCREENING FOR OTHER VIRAL DISEASES: ICD-10-CM

## 2021-05-11 DIAGNOSIS — R22.1 MASS OF RIGHT SIDE OF NECK: Primary | ICD-10-CM

## 2021-05-17 ENCOUNTER — LAB ENCOUNTER (OUTPATIENT)
Dept: LAB | Facility: HOSPITAL | Age: 51
End: 2021-05-17
Attending: OTOLARYNGOLOGY
Payer: COMMERCIAL

## 2021-05-17 ENCOUNTER — TELEPHONE (OUTPATIENT)
Dept: OTOLARYNGOLOGY | Facility: CLINIC | Age: 51
End: 2021-05-17

## 2021-05-17 DIAGNOSIS — Z01.818 PREOP EXAMINATION: ICD-10-CM

## 2021-05-17 DIAGNOSIS — Z11.59 ENCOUNTER FOR SCREENING FOR OTHER VIRAL DISEASES: ICD-10-CM

## 2021-05-18 NOTE — TELEPHONE ENCOUNTER
Pt had questions regarding US guided FNA. One [facility] and they will reach out to pt to explain procedure.

## 2021-05-20 ENCOUNTER — HOSPITAL ENCOUNTER (OUTPATIENT)
Dept: ULTRASOUND IMAGING | Facility: HOSPITAL | Age: 51
Discharge: HOME | End: 2021-05-20
Attending: OTOLARYNGOLOGY
Payer: COMMERCIAL

## 2021-05-20 ENCOUNTER — APPOINTMENT (OUTPATIENT)
Dept: LAB | Facility: HOSPITAL | Age: 51
End: 2021-05-20
Attending: OTOLARYNGOLOGY
Payer: COMMERCIAL

## 2021-05-20 DIAGNOSIS — R22.1 MASS OF RIGHT SIDE OF NECK: ICD-10-CM

## 2021-05-20 PROCEDURE — 76942 ECHO GUIDE FOR BIOPSY: CPT | Performed by: OTOLARYNGOLOGY

## 2021-05-20 PROCEDURE — 20206 BIOPSY MUSCLE PERQ NEEDLE: CPT | Performed by: OTOLARYNGOLOGY

## 2021-05-20 PROCEDURE — 88321 CONSLTJ&REPRT SLD PREP ELSWR: CPT | Performed by: OTOLARYNGOLOGY

## 2021-05-20 PROCEDURE — 88305 TISSUE EXAM BY PATHOLOGIST: CPT | Performed by: OTOLARYNGOLOGY

## 2021-05-20 NOTE — PROCEDURES
[facility]  Procedure Note    [place] Patient Status:  Outpatient    3/18/1970 MRN [id number]   Location [address] [place] [address] Attending Robert Gonzáles MD   Hosp Day # 0 PCP Nicky Bee DO     Procedure: Neck mass biopsy    Pre-Procedu

## 2021-05-25 ENCOUNTER — TELEPHONE (OUTPATIENT)
Dept: OTOLARYNGOLOGY | Facility: CLINIC | Age: 51
End: 2021-05-25

## 2021-05-25 NOTE — TELEPHONE ENCOUNTER
Leidy Fernandes MD  [facility] Clinical Staff  Please let her know that her biopsy specimen was sent to an outside pathology department at the [facility]. It may be a week or two before we have  result available.  We will call her as soon as we hear fro

## 2021-06-07 NOTE — PROGRESS NOTES
Chief Complaint:   Patient presents with:  Checkup: Patient noticed \"knot\" in her L hand about 3 months ago, not painful unless she presses on it. She notes her joints in her fingers are still sore since February.      HPI:     Ms. Darby Correia is a 46 year ol 03/03/2015    scanned to media tab   • HIP SURGERY     • LEG/ANKLE SURGERY PROC UNLISTED      per NG: right ankle sx   • OOPHORECTOMY  2013    per NG: fallopians removed from uterine area   • OTHER SURGICAL HISTORY  04/23/2018    cysto, right rpg, stone ma Orthopnea, Claudication, Edema, Palpitations  Respiratory: Cough, Sputum, Wheezing, Shortness of breath  Gastrointestinal: Nausea, Vomiting, Diarrhea, Constipation, Pain, Heartburn, Dysphagia, Bloody stools, Tarry stools  Genitourinary: Dysmenorrhea, Dysur Appropriate mood and affect      DATA REVIEWED   Labs:  Reviewed last set of pertinent blood work from 3/9/2021  –CMP within normal limits  –Intact PTH 74.7    Imaging:  X-ray of the hand 2/18/2021  FINDINGS:  No acute fractures or osseous lesions are iden changes.    5. Lesser incidental findings as above.              MRI Neck 4/30/2021  FINDINGS:         Mass present in the posterior right neck, in the region where a mass was seen on recent previous CT examination at the level of C1-2.  Mass measures 22 x left cervical lymph node 1.3 x 0.6 x 1.2 cm. There is an adjacent left-sided cervical lymph node 0.7 x 0.4 x 0.7. Right neck also with 1.2 x 0.3 x 1 cm mildly prominent right-sided cervical lymph node.   Follow-up CT scan favored benign/reactive nature in TREE minimally elevated titer with rest of AI work-up unremarkable. Mildly low C3 level. I suspect this is osteoarthritis, however joint stiffness is about 30 minutes in length and she does have positive TREE 1: 80 with very mildly low complement level.   -S

## 2021-06-07 NOTE — PATIENT INSTRUCTIONS
You were seen in clinic for this a knot in the L hand which may be early trigger finger versus a ganglion cyst as there is enlargement of the last 3 weeks, we do recommend evaluation by hand surgeon, Dr. Lindsey Rankin.      Experiencing osteoarthritis of both

## 2021-06-08 ENCOUNTER — OFFICE VISIT (OUTPATIENT)
Dept: INTERNAL MEDICINE CLINIC | Facility: CLINIC | Age: 51
End: 2021-06-08
Payer: COMMERCIAL

## 2021-06-08 VITALS
HEART RATE: 89 BPM | TEMPERATURE: 99 F | OXYGEN SATURATION: 98 % | WEIGHT: 161 LBS | SYSTOLIC BLOOD PRESSURE: 110 MMHG | BODY MASS INDEX: 25.27 KG/M2 | HEIGHT: 67 IN | DIASTOLIC BLOOD PRESSURE: 86 MMHG

## 2021-06-08 DIAGNOSIS — R22.1 SUBCUTANEOUS NODULE OF NECK: ICD-10-CM

## 2021-06-08 DIAGNOSIS — M67.442 GANGLION CYST OF FINGER OF LEFT HAND: Primary | ICD-10-CM

## 2021-06-08 DIAGNOSIS — R76.8 POSITIVE ANA (ANTINUCLEAR ANTIBODY): ICD-10-CM

## 2021-06-08 DIAGNOSIS — D35.2 PITUITARY ADENOMA (HCC): ICD-10-CM

## 2021-06-08 DIAGNOSIS — M25.50 POLYARTHRALGIA: ICD-10-CM

## 2021-06-08 PROCEDURE — 99214 OFFICE O/P EST MOD 30 MIN: CPT | Performed by: INTERNAL MEDICINE

## 2021-06-08 PROCEDURE — 3079F DIAST BP 80-89 MM HG: CPT | Performed by: INTERNAL MEDICINE

## 2021-06-08 PROCEDURE — 3074F SYST BP LT 130 MM HG: CPT | Performed by: INTERNAL MEDICINE

## 2021-06-08 PROCEDURE — 3008F BODY MASS INDEX DOCD: CPT | Performed by: INTERNAL MEDICINE

## 2021-06-08 RX ORDER — ERGOCALCIFEROL 1.25 MG/1
CAPSULE ORAL
Qty: 12 CAPSULE | Refills: 1 | Status: CANCELLED | OUTPATIENT
Start: 2021-06-08

## 2021-06-10 ENCOUNTER — OFFICE VISIT (OUTPATIENT)
Dept: OTOLARYNGOLOGY | Facility: CLINIC | Age: 51
End: 2021-06-10
Payer: COMMERCIAL

## 2021-06-10 VITALS
HEIGHT: 67 IN | DIASTOLIC BLOOD PRESSURE: 76 MMHG | BODY MASS INDEX: 25.27 KG/M2 | SYSTOLIC BLOOD PRESSURE: 100 MMHG | WEIGHT: 161 LBS

## 2021-06-10 DIAGNOSIS — R22.1 MASS OF RIGHT SIDE OF NECK: Primary | ICD-10-CM

## 2021-06-10 PROCEDURE — 99213 OFFICE O/P EST LOW 20 MIN: CPT | Performed by: OTOLARYNGOLOGY

## 2021-06-10 PROCEDURE — 3078F DIAST BP <80 MM HG: CPT | Performed by: OTOLARYNGOLOGY

## 2021-06-10 PROCEDURE — 3008F BODY MASS INDEX DOCD: CPT | Performed by: OTOLARYNGOLOGY

## 2021-06-10 PROCEDURE — 3074F SYST BP LT 130 MM HG: CPT | Performed by: OTOLARYNGOLOGY
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
## 2021-06-22 DIAGNOSIS — E55.9 VITAMIN D DEFICIENCY: Primary | ICD-10-CM

## 2021-06-22 RX ORDER — ERGOCALCIFEROL 1.25 MG/1
CAPSULE ORAL
Qty: 12 CAPSULE | Refills: 0 | OUTPATIENT
Start: 2021-06-22

## 2021-06-22 NOTE — TELEPHONE ENCOUNTER
LOV: 4/5/21  Last Refilled:#12, 0rfs  4/5/21  Labs:Vitamin D 21.5  2/25/21    No future appointments. Please advise.

## 2021-08-23 ENCOUNTER — OFFICE VISIT (OUTPATIENT)
Dept: SURGERY | Facility: CLINIC | Age: 51
End: 2021-08-23
Payer: COMMERCIAL

## 2021-08-23 DIAGNOSIS — M67.442 GANGLION OF LEFT HAND: Primary | ICD-10-CM

## 2021-08-23 PROCEDURE — 99243 OFF/OP CNSLTJ NEW/EST LOW 30: CPT | Performed by: PLASTIC SURGERY

## 2021-08-23 NOTE — H&P
Park Mauro is a 46year old female that presents with Patient presents with:  Ganglion: LEFT MIDDLE FINGER  .     REFERRED BY:   Dania Gilliland MD    Pacemaker: No  Latex Allergy: no  Coumadin: No  Plavix: No  Other anticoagulants: No  Cardiac stents: LEG/ANKLE SURGERY PROC UNLISTED      per NG: right ankle sx   • OOPHORECTOMY  2013    per NG: fallopians removed from uterine area   • OTHER SURGICAL HISTORY  04/23/2018    cysto, right rpg, stone manip, right stent dr Higinio Beth   • OTHER SURGICAL HISTORY  04/ intact  PSYCH: Oriented to person, place, time, and situation, Appropriate mood and affect      Hand Physical Exam:     Bilateral hands full painless range of motion with no tenderness    LMF proximal A1 pulley, midline 8 mm hard nontender cyst  Good flexi

## 2021-09-13 ENCOUNTER — HOSPITAL ENCOUNTER (OUTPATIENT)
Dept: MAMMOGRAPHY | Age: 51
Discharge: HOME | End: 2021-09-13
Attending: OBSTETRICS & GYNECOLOGY
Payer: COMMERCIAL

## 2021-09-13 DIAGNOSIS — Z12.31 ENCOUNTER FOR SCREENING MAMMOGRAM FOR MALIGNANT NEOPLASM OF BREAST: ICD-10-CM

## 2021-09-13 PROCEDURE — 77063 BREAST TOMOSYNTHESIS BI: CPT | Performed by: OBSTETRICS & GYNECOLOGY

## 2021-09-13 PROCEDURE — 77067 SCR MAMMO BI INCL CAD: CPT | Performed by: OBSTETRICS & GYNECOLOGY

## 2021-09-17 ENCOUNTER — HOSPITAL ENCOUNTER (OUTPATIENT)
Dept: MAMMOGRAPHY | Facility: HOSPITAL | Age: 51
Discharge: HOME | End: 2021-09-17
Attending: OBSTETRICS & GYNECOLOGY
Payer: COMMERCIAL

## 2021-09-17 DIAGNOSIS — R92.2 INCONCLUSIVE MAMMOGRAM: ICD-10-CM

## 2021-09-17 PROCEDURE — 77061 BREAST TOMOSYNTHESIS UNI: CPT | Performed by: OBSTETRICS & GYNECOLOGY

## 2021-09-17 PROCEDURE — 77065 DX MAMMO INCL CAD UNI: CPT | Performed by: OBSTETRICS & GYNECOLOGY

## 2021-11-10 RX ORDER — CABERGOLINE 0.5 MG/1
0.25 TABLET ORAL WEEKLY
Qty: 8 TABLET | Refills: 2 | OUTPATIENT
Start: 2021-11-10 | End: 2022-10-12

## 2021-11-10 NOTE — TELEPHONE ENCOUNTER
See 11/10 Endocrinology xiao qu wu youhart message. Refill request denied.     Requested Prescriptions     Refused Prescriptions Disp Refills   • CABERGOLINE 0.5 MG Oral Tab [Pharmacy Med Name: CABERGOLINE 0.5 MG TABLET] 8 tablet 2     Sig: TAKE 0.5 TABLETS (0.25 MG T

## 2022-01-20 ENCOUNTER — OFFICE VISIT (OUTPATIENT)
Dept: OPHTHALMOLOGY | Facility: CLINIC | Age: 52
End: 2022-01-20
Payer: COMMERCIAL

## 2022-01-20 DIAGNOSIS — H40.003 GLAUCOMA SUSPECT OF BOTH EYES: Primary | ICD-10-CM

## 2022-01-20 DIAGNOSIS — H52.03 HYPEROPIA WITH PRESBYOPIA OF BOTH EYES: ICD-10-CM

## 2022-01-20 DIAGNOSIS — H46.9 OPTIC NEUROPATHY, LEFT: ICD-10-CM

## 2022-01-20 DIAGNOSIS — H52.4 HYPEROPIA WITH PRESBYOPIA OF BOTH EYES: ICD-10-CM

## 2022-01-20 PROCEDURE — 92015 DETERMINE REFRACTIVE STATE: CPT | Performed by: OPHTHALMOLOGY

## 2022-01-20 PROCEDURE — 92014 COMPRE OPH EXAM EST PT 1/>: CPT | Performed by: OPHTHALMOLOGY

## 2022-01-20 NOTE — PROGRESS NOTES
[place] El is a 46year old female. HPI:     HPI     Patient is here for a complete exam.  She states her vision is good. She denies blurry vision. She has no ocular complaints.       Last edited by Mayank Antonio OT on 1/20/2022  1:14 PM. (Histo Cap Take one weekly for 12 weeks. 12 capsule 0   • cabergoline 0.5 MG Oral Tab Take 0.5 tablets (0.25 mg total) by mouth once a week. 6 tablet 0   • Multiple Vitamins-Minerals (WOMENS MULTIVITAMIN PLUS) Oral Tab Take  by mouth daily.          Allergies: Cylinder Dist [place]    Right +4.75 Sphere 20/20     Left +2.00 Sphere 20/25+     Type: Distance only          Final Rx #2       Sphere Cylinder Dist [place]    Right +6.75 Sphere  20/20    Left +4.00 Sphere  20/20    Type: Reading only

## 2022-08-16 ENCOUNTER — HOSPITAL ENCOUNTER (OUTPATIENT)
Dept: MAMMOGRAPHY | Facility: HOSPITAL | Age: 52
Discharge: HOME | End: 2022-08-16
Attending: OBSTETRICS & GYNECOLOGY
Payer: COMMERCIAL

## 2022-08-16 DIAGNOSIS — N64.4 PAIN OF BREAST: ICD-10-CM

## 2022-08-16 LAB — HM MAMMOGRAPHY BILATERAL: NORMAL

## 2022-08-16 PROCEDURE — 77062 BREAST TOMOSYNTHESIS BI: CPT | Performed by: OBSTETRICS & GYNECOLOGY

## 2022-08-16 PROCEDURE — 76642 ULTRASOUND BREAST LIMITED: CPT | Performed by: OBSTETRICS & GYNECOLOGY

## 2022-08-16 PROCEDURE — 77066 DX MAMMO INCL CAD BI: CPT | Performed by: OBSTETRICS & GYNECOLOGY

## 2022-09-22 ENCOUNTER — LAB ENCOUNTER (OUTPATIENT)
Dept: LAB | Age: 52
End: 2022-09-22
Attending: INTERNAL MEDICINE

## 2022-09-22 ENCOUNTER — PATIENT MESSAGE (OUTPATIENT)
Dept: INTERNAL MEDICINE CLINIC | Facility: CLINIC | Age: 52
End: 2022-09-22

## 2022-09-22 ENCOUNTER — OFFICE VISIT (OUTPATIENT)
Dept: INTERNAL MEDICINE CLINIC | Facility: CLINIC | Age: 52
End: 2022-09-22

## 2022-09-22 VITALS
OXYGEN SATURATION: 99 % | DIASTOLIC BLOOD PRESSURE: 88 MMHG | SYSTOLIC BLOOD PRESSURE: 144 MMHG | TEMPERATURE: 98 F | HEIGHT: 67 IN | HEART RATE: 91 BPM | WEIGHT: 159.19 LBS | BODY MASS INDEX: 24.99 KG/M2

## 2022-09-22 DIAGNOSIS — R22.2 SUBCUTANEOUS NODULE OF ABDOMINAL WALL: ICD-10-CM

## 2022-09-22 DIAGNOSIS — Z00.00 ANNUAL PHYSICAL EXAM: ICD-10-CM

## 2022-09-22 DIAGNOSIS — Z00.00 ANNUAL PHYSICAL EXAM: Primary | ICD-10-CM

## 2022-09-22 LAB
BASOPHILS # BLD AUTO: 0.04 X10(3) UL (ref 0–0.2)
BASOPHILS NFR BLD AUTO: 0.9 %
CHOLEST SERPL-MCNC: 175 MG/DL (ref ?–200)
DEPRECATED RDW RBC AUTO: 44.9 FL (ref 35.1–46.3)
EOSINOPHIL # BLD AUTO: 0.14 X10(3) UL (ref 0–0.7)
EOSINOPHIL NFR BLD AUTO: 3.2 %
ERYTHROCYTE [DISTWIDTH] IN BLOOD BY AUTOMATED COUNT: 12.6 % (ref 11–15)
FASTING PATIENT LIPID ANSWER: YES
HCT VFR BLD AUTO: 40.1 %
HDLC SERPL-MCNC: 72 MG/DL (ref 40–59)
HGB BLD-MCNC: 13.4 G/DL
IMM GRANULOCYTES # BLD AUTO: 0.01 X10(3) UL (ref 0–1)
IMM GRANULOCYTES NFR BLD: 0.2 %
LDLC SERPL CALC-MCNC: 93 MG/DL (ref ?–100)
LYMPHOCYTES # BLD AUTO: 2.02 X10(3) UL (ref 1–4)
LYMPHOCYTES NFR BLD AUTO: 46.3 %
MCH RBC QN AUTO: 32.2 PG (ref 26–34)
MCHC RBC AUTO-ENTMCNC: 33.4 G/DL (ref 31–37)
MCV RBC AUTO: 96.4 FL
MONOCYTES # BLD AUTO: 0.27 X10(3) UL (ref 0.1–1)
MONOCYTES NFR BLD AUTO: 6.2 %
NEUTROPHILS # BLD AUTO: 1.88 X10 (3) UL (ref 1.5–7.7)
NEUTROPHILS # BLD AUTO: 1.88 X10(3) UL (ref 1.5–7.7)
NEUTROPHILS NFR BLD AUTO: 43.2 %
NONHDLC SERPL-MCNC: 103 MG/DL (ref ?–130)
PLATELET # BLD AUTO: 149 10(3)UL (ref 150–450)
RBC # BLD AUTO: 4.16 X10(6)UL
TRIGL SERPL-MCNC: 48 MG/DL (ref 30–149)
VLDLC SERPL CALC-MCNC: 8 MG/DL (ref 0–30)
WBC # BLD AUTO: 4.4 X10(3) UL (ref 4–11)

## 2022-09-22 PROCEDURE — 3008F BODY MASS INDEX DOCD: CPT | Performed by: INTERNAL MEDICINE

## 2022-09-22 PROCEDURE — 85025 COMPLETE CBC W/AUTO DIFF WBC: CPT

## 2022-09-22 PROCEDURE — 80061 LIPID PANEL: CPT

## 2022-09-22 PROCEDURE — 3077F SYST BP >= 140 MM HG: CPT | Performed by: INTERNAL MEDICINE

## 2022-09-22 PROCEDURE — 99396 PREV VISIT EST AGE 40-64: CPT | Performed by: INTERNAL MEDICINE

## 2022-09-22 PROCEDURE — 36415 COLL VENOUS BLD VENIPUNCTURE: CPT

## 2022-09-22 PROCEDURE — 3079F DIAST BP 80-89 MM HG: CPT | Performed by: INTERNAL MEDICINE

## 2022-09-23 ENCOUNTER — PATIENT MESSAGE (OUTPATIENT)
Dept: INTERNAL MEDICINE CLINIC | Facility: CLINIC | Age: 52
End: 2022-09-23

## 2022-09-23 NOTE — TELEPHONE ENCOUNTER
From: Imelda Donato  To: Dorina Talavera,   Sent: 9/22/2022 7:42 PM CDT  Subject: Test Results    Hi Dr. Christiano Brandt,    I just reviewed the results of my blood work. My platelet count is 067 and as you know, the range is 150 - 450. I didn't realize that it has been as low as 123 over the years. Is this something that I should be concerned with? Also, in my rush today, I forgot to ask you if my neck seemed swollen to you? After you checked my neck, you asked me if I felt okay. Lastly, I forgot to ask you your thoughts on the vitamin Nutrafol. Do you see any medical reason why I should not take it?     Thanks,    Natalie Mo

## 2022-09-29 NOTE — TELEPHONE ENCOUNTER
From: Keyla Gibbs  Sent: 9/28/2022 7:45 PM CDT  To: [facility] Clinical Staff  Subject: results    That's good news. Thank you.

## 2022-10-09 ENCOUNTER — TELEPHONE (OUTPATIENT)
Dept: INTERNAL MEDICINE CLINIC | Facility: CLINIC | Age: 52
End: 2022-10-09

## 2022-10-09 NOTE — TELEPHONE ENCOUNTER
On call phone call Sunday  [phone]  Tested positive for covid today. Sx onset 3 days ago sore throat. Last night temp 102--now down to 99.7, cough. No SOB. Discussed symptomatic tx,  Discussed isolation for 10 days--last 5 d of which if better can be out with mask. Discussed paxlovid has EUA to help prevent progression in pts at risk. Unable to identify for pt a risk factor for progression to severe covid. Decided to forgo paxlovid rx for now. Advised her to call Dr Florencio Michaels in am with status update and her recom. Call or go to ER sooner if worse. Pt expressed understanding. Routed to Dr Florencio Michaels.

## 2022-11-04 ENCOUNTER — HOSPITAL ENCOUNTER (OUTPATIENT)
Age: 52
Discharge: HOME | End: 2022-11-04
Payer: COMMERCIAL

## 2022-11-04 ENCOUNTER — APPOINTMENT (OUTPATIENT)
Dept: GENERAL RADIOLOGY | Age: 52
End: 2022-11-04
Attending: NURSE PRACTITIONER
Payer: COMMERCIAL

## 2022-11-04 VITALS
DIASTOLIC BLOOD PRESSURE: 89 MMHG | TEMPERATURE: 99 F | OXYGEN SATURATION: 99 % | RESPIRATION RATE: 16 BRPM | HEART RATE: 98 BPM | SYSTOLIC BLOOD PRESSURE: 135 MMHG

## 2022-11-04 DIAGNOSIS — J40 BRONCHITIS: Primary | ICD-10-CM

## 2022-11-04 PROCEDURE — 71046 X-RAY EXAM CHEST 2 VIEWS: CPT | Performed by: NURSE PRACTITIONER

## 2022-11-04 PROCEDURE — 99203 OFFICE O/P NEW LOW 30 MIN: CPT

## 2022-11-04 PROCEDURE — 99213 OFFICE O/P EST LOW 20 MIN: CPT

## 2022-11-04 RX ORDER — ALBUTEROL SULFATE 90 UG/1
2 AEROSOL, METERED RESPIRATORY (INHALATION) EVERY 4 HOURS PRN
Qty: 18 G | Refills: 0 | Status: SHIPPED | OUTPATIENT
Start: 2022-11-04

## 2022-11-04 RX ORDER — METHYLPREDNISOLONE 4 MG/1
TABLET ORAL
Qty: 21 TABLET | Refills: 0 | Status: SHIPPED | OUTPATIENT
Start: 2022-11-04

## 2022-11-04 NOTE — ED INITIAL ASSESSMENT (HPI)
C/o lingering cough since Oct. 8, 2022. 10/10 22 positive for Covid, still with chest congestion and  coughing-up phlegm. No fever.

## 2022-11-04 NOTE — DISCHARGE INSTRUCTIONS
1. Drink plenty fluids  2. During the day, you may use Mucinex DM one tab every 12 hours    3. Also consider using inhaler every 4 hours   4. Take steroids as prescribed. 5.  Follow-up with your family doctor in 2-3 days if no better  6. Return to ED for any worsening or persisting symptoms, shortness of breath, chest pain, dizziness, fever.

## 2023-01-05 LAB — COLONOSCOPY STUDY: NORMAL

## 2023-01-24 ENCOUNTER — TELEPHONE (OUTPATIENT)
Dept: OPHTHALMOLOGY | Facility: CLINIC | Age: 53
End: 2023-01-24

## 2023-01-24 ENCOUNTER — OFFICE VISIT (OUTPATIENT)
Dept: OPHTHALMOLOGY | Facility: CLINIC | Age: 53
End: 2023-01-24

## 2023-01-24 DIAGNOSIS — H43.392 FLOATERS, LEFT: ICD-10-CM

## 2023-01-24 DIAGNOSIS — H46.9 OPTIC NEUROPATHY, LEFT: ICD-10-CM

## 2023-01-24 DIAGNOSIS — H52.4 HYPEROPIA WITH PRESBYOPIA OF BOTH EYES: ICD-10-CM

## 2023-01-24 DIAGNOSIS — H40.003 GLAUCOMA SUSPECT OF BOTH EYES: Primary | ICD-10-CM

## 2023-01-24 DIAGNOSIS — H52.03 HYPEROPIA WITH PRESBYOPIA OF BOTH EYES: ICD-10-CM

## 2023-01-24 PROCEDURE — 92014 COMPRE OPH EXAM EST PT 1/>: CPT | Performed by: OPHTHALMOLOGY

## 2023-01-24 PROCEDURE — 92015 DETERMINE REFRACTIVE STATE: CPT | Performed by: OPHTHALMOLOGY

## 2023-01-24 PROCEDURE — 92250 FUNDUS PHOTOGRAPHY W/I&R: CPT | Performed by: OPHTHALMOLOGY

## 2023-01-24 NOTE — ASSESSMENT & PLAN NOTE
Discussed with patient that she is a glaucoma suspect based on increased cupping of the optic nerve in the left eye. Retinal photos taken today to document optic nerves. Glaucoma diagnostic testing ordered. Will not start medication, but will continue to observe. Patient verbalized understanding.

## 2023-01-24 NOTE — PATIENT INSTRUCTIONS
Glaucoma suspect  Discussed with patient that she is a glaucoma suspect based on increased cupping of the optic nerve in the left eye. Retinal photos taken today to document optic nerves. Glaucoma diagnostic testing ordered. Will not start medication, but will continue to observe. Patient verbalized understanding. Hyperopia with presbyopia  RX for separate distance and reading glasses per patient's choice. 

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
## 2023-01-24 NOTE — TELEPHONE ENCOUNTER
Spoke to pt and let her know her eye exam was relatively normal and she had no cataracts and it could just be that certain headlights are brighter than others and that is what is causing her to have some trouble when driving at night.

## 2023-01-25 ENCOUNTER — TELEPHONE (OUTPATIENT)
Dept: OPHTHALMOLOGY | Facility: CLINIC | Age: 53
End: 2023-01-25

## 2023-02-04 ENCOUNTER — NURSE ONLY (OUTPATIENT)
Dept: OPHTHALMOLOGY | Facility: CLINIC | Age: 53
End: 2023-02-04

## 2023-02-04 DIAGNOSIS — H40.003 GLAUCOMA SUSPECT OF BOTH EYES: ICD-10-CM

## 2023-02-04 PROCEDURE — 92133 CPTRZD OPH DX IMG PST SGM ON: CPT | Performed by: OPHTHALMOLOGY

## 2023-02-04 PROCEDURE — 92083 EXTENDED VISUAL FIELD XM: CPT | Performed by: OPHTHALMOLOGY

## 2023-02-05 NOTE — ASSESSMENT & PLAN NOTE
Normal visual field, OCT, right eye. Abnormal visual field, OCT, left eye, secondary to previous left traumatic optic neuropathy.

## 2023-02-07 ENCOUNTER — TELEPHONE (OUTPATIENT)
Dept: OPHTHALMOLOGY | Facility: CLINIC | Age: 53
End: 2023-02-07

## 2023-02-24 ENCOUNTER — OFFICE VISIT (OUTPATIENT)
Dept: INTERNAL MEDICINE CLINIC | Facility: CLINIC | Age: 53
End: 2023-02-24

## 2023-02-24 VITALS
TEMPERATURE: 98 F | OXYGEN SATURATION: 100 % | BODY MASS INDEX: 25.4 KG/M2 | WEIGHT: 161.81 LBS | HEART RATE: 78 BPM | HEIGHT: 67 IN | DIASTOLIC BLOOD PRESSURE: 76 MMHG | SYSTOLIC BLOOD PRESSURE: 120 MMHG

## 2023-02-24 DIAGNOSIS — L91.8 SKIN TAG: Primary | ICD-10-CM

## 2023-02-24 PROCEDURE — 3074F SYST BP LT 130 MM HG: CPT | Performed by: INTERNAL MEDICINE

## 2023-02-24 PROCEDURE — 3078F DIAST BP <80 MM HG: CPT | Performed by: INTERNAL MEDICINE

## 2023-02-24 PROCEDURE — 3008F BODY MASS INDEX DOCD: CPT | Performed by: INTERNAL MEDICINE

## 2023-06-07 ENCOUNTER — OFFICE VISIT (OUTPATIENT)
Dept: INTERNAL MEDICINE CLINIC | Facility: CLINIC | Age: 53
End: 2023-06-07

## 2023-06-07 VITALS
HEART RATE: 85 BPM | BODY MASS INDEX: 24.48 KG/M2 | TEMPERATURE: 98 F | DIASTOLIC BLOOD PRESSURE: 82 MMHG | OXYGEN SATURATION: 100 % | SYSTOLIC BLOOD PRESSURE: 128 MMHG | WEIGHT: 156 LBS | HEIGHT: 67 IN

## 2023-06-07 DIAGNOSIS — R10.13 EPIGASTRIC PAIN: ICD-10-CM

## 2023-06-07 DIAGNOSIS — R68.89 CONGESTION OF THROAT: Primary | ICD-10-CM

## 2023-06-07 DIAGNOSIS — R07.81 RIB PAIN: ICD-10-CM

## 2023-06-07 PROCEDURE — 99214 OFFICE O/P EST MOD 30 MIN: CPT | Performed by: INTERNAL MEDICINE

## 2023-06-07 PROCEDURE — 3079F DIAST BP 80-89 MM HG: CPT | Performed by: INTERNAL MEDICINE

## 2023-06-07 PROCEDURE — 3008F BODY MASS INDEX DOCD: CPT | Performed by: INTERNAL MEDICINE

## 2023-06-07 PROCEDURE — 3074F SYST BP LT 130 MM HG: CPT | Performed by: INTERNAL MEDICINE

## 2023-06-07 RX ORDER — CABERGOLINE 0.5 MG/1
0.25 TABLET ORAL WEEKLY
COMMUNITY
Start: 2023-05-15

## 2023-06-07 RX ORDER — OMEPRAZOLE 40 MG/1
40 CAPSULE, DELAYED RELEASE ORAL DAILY
Qty: 90 CAPSULE | Refills: 0 | Status: SHIPPED | OUTPATIENT
Start: 2023-06-07 | End: 2024-06-01

## 2023-06-11 PROBLEM — D25.9 UTERINE FIBROID: Status: ACTIVE | Noted: 2023-06-11

## 2023-06-11 PROBLEM — D35.2 PITUITARY ADENOMA (CMD): Status: ACTIVE | Noted: 2023-06-11

## 2023-06-12 ENCOUNTER — OFFICE VISIT (OUTPATIENT)
Dept: OBGYN | Age: 53
End: 2023-06-12

## 2023-06-12 VITALS
SYSTOLIC BLOOD PRESSURE: 149 MMHG | DIASTOLIC BLOOD PRESSURE: 91 MMHG | WEIGHT: 155 LBS | OXYGEN SATURATION: 99 % | BODY MASS INDEX: 24.33 KG/M2 | TEMPERATURE: 98.1 F | HEART RATE: 79 BPM | HEIGHT: 67 IN

## 2023-06-12 DIAGNOSIS — Z11.51 SCREENING FOR HPV (HUMAN PAPILLOMAVIRUS): ICD-10-CM

## 2023-06-12 DIAGNOSIS — D35.2 PITUITARY ADENOMA (CMD): ICD-10-CM

## 2023-06-12 DIAGNOSIS — Z01.419 ROUTINE GYNECOLOGICAL EXAMINATION: Primary | ICD-10-CM

## 2023-06-12 DIAGNOSIS — D25.9 UTERINE LEIOMYOMA, UNSPECIFIED LOCATION: ICD-10-CM

## 2023-06-12 DIAGNOSIS — N64.4 BREAST PAIN, RIGHT: ICD-10-CM

## 2023-06-12 PROCEDURE — 99386 PREV VISIT NEW AGE 40-64: CPT | Performed by: OBSTETRICS & GYNECOLOGY

## 2023-06-12 PROCEDURE — 88175 CYTOPATH C/V AUTO FLUID REDO: CPT | Performed by: INTERNAL MEDICINE

## 2023-06-12 PROCEDURE — 87624 HPV HI-RISK TYP POOLED RSLT: CPT | Performed by: INTERNAL MEDICINE

## 2023-06-12 RX ORDER — CABERGOLINE 0.5 MG/1
0.25 TABLET ORAL
COMMUNITY
Start: 2022-09-06

## 2023-06-12 RX ORDER — OMEPRAZOLE 40 MG/1
40 CAPSULE, DELAYED RELEASE ORAL
COMMUNITY
Start: 2023-06-07 | End: 2024-06-01

## 2023-06-12 ASSESSMENT — PATIENT HEALTH QUESTIONNAIRE - PHQ9
SUM OF ALL RESPONSES TO PHQ9 QUESTIONS 1 AND 2: 0
2. FEELING DOWN, DEPRESSED OR HOPELESS: NOT AT ALL
1. LITTLE INTEREST OR PLEASURE IN DOING THINGS: NOT AT ALL
SUM OF ALL RESPONSES TO PHQ9 QUESTIONS 1 AND 2: 0
CLINICAL INTERPRETATION OF PHQ2 SCORE: NO FURTHER SCREENING NEEDED

## 2023-06-12 ASSESSMENT — ENCOUNTER SYMPTOMS
ALLERGIC/IMMUNOLOGIC COMMENTS: PCN ALLERGY
GASTROINTESTINAL NEGATIVE: 1
CONSTITUTIONAL NEGATIVE: 1

## 2023-06-14 LAB
CASE RPRT: NORMAL
CLINICAL INFO: NORMAL
CYTOLOGY CVX/VAG STUDY: NORMAL
HPV16+18+45 E6+E7MRNA CVX NAA+PROBE: NEGATIVE
Lab: NORMAL
PAP EDUCATIONAL NOTE: NORMAL
SPECIMEN ADEQUACY: NORMAL

## 2023-06-15 ENCOUNTER — PATIENT MESSAGE (OUTPATIENT)
Dept: OTOLARYNGOLOGY | Facility: CLINIC | Age: 53
End: 2023-06-15

## 2023-06-15 ENCOUNTER — PATIENT MESSAGE (OUTPATIENT)
Dept: INTERNAL MEDICINE CLINIC | Facility: CLINIC | Age: 53
End: 2023-06-15

## 2023-06-15 DIAGNOSIS — R22.1 NECK MASS: Primary | ICD-10-CM

## 2023-06-15 NOTE — TELEPHONE ENCOUNTER
From: Kiara Seo  To: Isidra Fulton MD  Sent: 6/15/2023 4:04 PM CDT  Subject: 3 CM Mass on right side of neck    Hello Dr. Fernanda Fulton,    Back in November, 2021, I was supposed to follow up with you regarding the mass (vascular structure within the paraspinous musculature) on the right side of my neck. I'm not sure how I let this slip my mind. I'd like to have another scan performed of my neck. The other option you and I discussed was having some type of embolization procedure. Given the amount of time I've let pass, I'd like to discuss this at your earliest convenience.      Thank you,    Jorge Foy

## 2023-06-16 ENCOUNTER — TELEPHONE (OUTPATIENT)
Dept: INTERNAL MEDICINE CLINIC | Facility: CLINIC | Age: 53
End: 2023-06-16

## 2023-06-16 DIAGNOSIS — R07.81 RIB PAIN: Primary | ICD-10-CM

## 2023-06-16 NOTE — TELEPHONE ENCOUNTER
From: Be Sullivan  To: Geo GrossDO abraham  Sent: 6/15/2023 2:37 PM CDT  Subject: Follow up on issues from 6/7/2023 appointment    Hi Dr. Anila Schaefer,    I have a few comments / questions. 1. The lump on my chest / rib cage is still there. I have been doing the exercises you showed me. Additionally, I still have pain associated with it. The pain seems to radiate from my left collarbone, left shoulder and even the top left side of my back. I'm a little bit more concerned about this issue. 2. How long should I take the Guerraview? I haven't yet started taking it but plan to start today. Also, how long should I continue to use the nose spray? My apologies but I don't have the paper, with me, (I'm at work) that you gave me regarding the nose spray. I still have the phlegm in my chest daily. Hopefully when I start using the Guerraview, that will help to get rid of it. 3. Lastly, my stomach still hurts every now and again. I have changed my diet. No spicy foods in the last week and a half. I am also taking the medicine you prescribed. I went ahead and scheduled an appointment for an image of the abdomen, just in case. I can always cancel it if necessary.     Thank you,    Con Fadia

## 2023-06-16 NOTE — TELEPHONE ENCOUNTER
This message sent to Dr Scooter Robin in telephone encounter  Please see 6/16 telephone encounter for further documentation

## 2023-06-19 ENCOUNTER — IMAGING SERVICES (OUTPATIENT)
Dept: ULTRASOUND IMAGING | Age: 53
End: 2023-06-19
Attending: OBSTETRICS & GYNECOLOGY

## 2023-06-19 DIAGNOSIS — D25.9 UTERINE LEIOMYOMA, UNSPECIFIED LOCATION: ICD-10-CM

## 2023-06-23 ENCOUNTER — HOSPITAL ENCOUNTER (OUTPATIENT)
Dept: GENERAL RADIOLOGY | Facility: HOSPITAL | Age: 53
Discharge: HOME | End: 2023-06-23
Attending: INTERNAL MEDICINE
Payer: COMMERCIAL

## 2023-06-23 DIAGNOSIS — R07.81 RIB PAIN: ICD-10-CM

## 2023-06-23 PROCEDURE — 71101 X-RAY EXAM UNILAT RIBS/CHEST: CPT | Performed by: INTERNAL MEDICINE

## 2023-06-26 ENCOUNTER — PATIENT MESSAGE (OUTPATIENT)
Dept: INTERNAL MEDICINE CLINIC | Facility: CLINIC | Age: 53
End: 2023-06-26

## 2023-06-27 ENCOUNTER — HOSPITAL ENCOUNTER (OUTPATIENT)
Dept: MRI IMAGING | Facility: HOSPITAL | Age: 53
Discharge: HOME | End: 2023-06-27
Attending: OTOLARYNGOLOGY
Payer: COMMERCIAL

## 2023-06-27 DIAGNOSIS — R22.1 NECK MASS: ICD-10-CM

## 2023-06-27 PROCEDURE — A9575 INJ GADOTERATE MEGLUMI 0.1ML: HCPCS | Performed by: OTOLARYNGOLOGY

## 2023-06-27 PROCEDURE — 70543 MRI ORBT/FAC/NCK W/O &W/DYE: CPT | Performed by: OTOLARYNGOLOGY

## 2023-06-27 RX ORDER — GADOTERATE MEGLUMINE 376.9 MG/ML
15 INJECTION INTRAVENOUS
Status: COMPLETED | OUTPATIENT
Start: 2023-06-27 | End: 2023-06-27

## 2023-06-27 RX ADMIN — GADOTERATE MEGLUMINE 14 ML: 376.9 INJECTION INTRAVENOUS at 07:39:00

## 2023-07-03 ENCOUNTER — TELEPHONE (OUTPATIENT)
Dept: OTOLARYNGOLOGY | Facility: CLINIC | Age: 53
End: 2023-07-03

## 2023-07-03 NOTE — TELEPHONE ENCOUNTER
Patient called, hoping to speak with Dr Monique Bautista regarding messages below   Tasked to nursing

## 2023-07-07 ENCOUNTER — HOSPITAL ENCOUNTER (OUTPATIENT)
Dept: ULTRASOUND IMAGING | Facility: HOSPITAL | Age: 53
Discharge: HOME | End: 2023-07-07
Attending: INTERNAL MEDICINE
Payer: COMMERCIAL

## 2023-07-07 DIAGNOSIS — R22.2 SUBCUTANEOUS NODULE OF ABDOMINAL WALL: ICD-10-CM

## 2023-07-07 PROCEDURE — 76705 ECHO EXAM OF ABDOMEN: CPT | Performed by: INTERNAL MEDICINE

## 2023-07-14 ENCOUNTER — HOSPITAL ENCOUNTER (OUTPATIENT)
Dept: MAMMOGRAPHY | Facility: HOSPITAL | Age: 53
Discharge: HOME | End: 2023-07-14
Attending: OBSTETRICS & GYNECOLOGY
Payer: COMMERCIAL

## 2023-07-14 DIAGNOSIS — N64.4 BREAST PAIN, RIGHT: ICD-10-CM

## 2023-07-14 PROCEDURE — 77066 DX MAMMO INCL CAD BI: CPT | Performed by: OBSTETRICS & GYNECOLOGY

## 2023-07-14 PROCEDURE — 77062 BREAST TOMOSYNTHESIS BI: CPT | Performed by: OBSTETRICS & GYNECOLOGY

## 2023-07-19 DIAGNOSIS — N64.4 BREAST PAIN, RIGHT: ICD-10-CM

## 2023-09-06 NOTE — TELEPHONE ENCOUNTER
Per 6/7/23 OV--  1. Abdominal pain  Suspect gastritis--trial PPI x 4-6 weeks  Given heartburn diet  Monitor for biliary colic    Mychart sent to patient to see how she is feeling and if Omeprazole has helped.

## 2023-09-27 RX ORDER — OMEPRAZOLE 40 MG/1
40 CAPSULE, DELAYED RELEASE ORAL DAILY
Qty: 90 CAPSULE | Refills: 0 | OUTPATIENT
Start: 2023-09-27 | End: 2024-09-21

## 2023-10-04 ENCOUNTER — PATIENT MESSAGE (OUTPATIENT)
Dept: INTERNAL MEDICINE CLINIC | Facility: CLINIC | Age: 53
End: 2023-10-04

## 2023-10-04 DIAGNOSIS — R07.89 ATYPICAL CHEST PAIN: Primary | ICD-10-CM

## 2023-10-06 NOTE — TELEPHONE ENCOUNTER
From: Frankie Godoy  To: Nancy Pryor  Sent: 10/4/2023 10:24 PM CDT  Subject: Lipomas    Hi Dr. Tonny Ngo,    I'm responding to your July 9, 2023 message regarding the results of the ultrasound of my stomach and the assumed finding that the lumps are lipomas. I've developed one or two more lipomas on my stomach. I now have three of these lipomas / knots. If they are indeed lipomas, from what I've ready on the internet, they are not harmful. On another note, when I last met with you, I believe I mentioned that I had a pain in my chest that occurred after I lifted a heavy case of water. I still feel that discomfort. You mentioned that maybe I should go to physical therapy. That is a subject I'd like to explore further.     Thanks,    Claudean Man

## 2023-10-10 ENCOUNTER — PATIENT MESSAGE (OUTPATIENT)
Dept: INTERNAL MEDICINE CLINIC | Facility: CLINIC | Age: 53
End: 2023-10-10

## 2023-10-10 NOTE — TELEPHONE ENCOUNTER
From: Regulo Castanon  To: Jin Gordillo  Sent: 10/10/2023 11:54 AM CDT  Subject: Left Elbow    Hi Dr. Shantel Wilson,    A couple of months ago, I cracked my left elbow on something. It hurt the same as when one stubs their big toe which was a great deal but of course the initial pain subsided. My issue is that two months or more later, it is still sore. 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 I assumed that I did not break the bone when I hit it a couple of months ago but it has been sore ever since. Occasionally it is more than sore, like today, so I thought I'd better mention it. Do you think I need to get an x-ray of the elbow at this late stage?     Thanks,    Jayne Schneider

## 2023-11-13 ENCOUNTER — OFFICE VISIT (OUTPATIENT)
Dept: INTERNAL MEDICINE CLINIC | Facility: CLINIC | Age: 53
End: 2023-11-13

## 2023-11-13 ENCOUNTER — TELEPHONE (OUTPATIENT)
Dept: INTERNAL MEDICINE CLINIC | Facility: CLINIC | Age: 53
End: 2023-11-13

## 2023-11-13 VITALS
BODY MASS INDEX: 23.54 KG/M2 | HEART RATE: 81 BPM | RESPIRATION RATE: 20 BRPM | TEMPERATURE: 97 F | SYSTOLIC BLOOD PRESSURE: 132 MMHG | DIASTOLIC BLOOD PRESSURE: 89 MMHG | HEIGHT: 67 IN | OXYGEN SATURATION: 100 % | WEIGHT: 150 LBS

## 2023-11-13 DIAGNOSIS — R07.81 RIB PAIN: Primary | ICD-10-CM

## 2023-11-13 DIAGNOSIS — Z00.00 ANNUAL PHYSICAL EXAM: Primary | ICD-10-CM

## 2023-11-13 DIAGNOSIS — R92.30 DENSE BREAST: ICD-10-CM

## 2023-11-13 DIAGNOSIS — Z12.31 ENCOUNTER FOR SCREENING MAMMOGRAM FOR MALIGNANT NEOPLASM OF BREAST: ICD-10-CM

## 2023-11-13 DIAGNOSIS — R92.2 DENSE BREAST: ICD-10-CM

## 2023-11-13 RX ORDER — PREDNISONE 20 MG/1
20 TABLET ORAL DAILY
Qty: 5 TABLET | Refills: 0 | Status: SHIPPED | OUTPATIENT
Start: 2023-11-13

## 2023-11-13 RX ORDER — MONTELUKAST SODIUM 10 MG/1
10 TABLET ORAL DAILY
Qty: 90 TABLET | Refills: 3 | Status: SHIPPED | OUTPATIENT
Start: 2023-11-13 | End: 2024-11-07

## 2023-11-30 ENCOUNTER — HOSPITAL ENCOUNTER (OUTPATIENT)
Dept: CT IMAGING | Facility: HOSPITAL | Age: 53
Discharge: HOME | End: 2023-11-30
Attending: INTERNAL MEDICINE
Payer: COMMERCIAL

## 2023-11-30 DIAGNOSIS — R07.81 RIB PAIN: ICD-10-CM

## 2023-11-30 PROCEDURE — 71250 CT THORAX DX C-: CPT | Performed by: INTERNAL MEDICINE

## 2023-12-01 ENCOUNTER — TELEPHONE (OUTPATIENT)
Dept: INTERNAL MEDICINE CLINIC | Facility: CLINIC | Age: 53
End: 2023-12-01

## 2023-12-01 DIAGNOSIS — R91.8 LUNG NODULES: Primary | ICD-10-CM

## 2023-12-01 DIAGNOSIS — R59.0 MEDIASTINAL LYMPHADENOPATHY: ICD-10-CM

## 2023-12-01 NOTE — TELEPHONE ENCOUNTER
Spoke w pt about ct results Rhofade Counseling: Rhofade is a topical medication which can decrease superficial blood flow where applied. Side effects are uncommon and include stinging, redness and allergic reactions.

## 2023-12-20 ENCOUNTER — HOSPITAL ENCOUNTER (EMERGENCY)
Facility: HOSPITAL | Age: 53
Discharge: HOME | End: 2023-12-20
Attending: EMERGENCY MEDICINE
Payer: COMMERCIAL

## 2023-12-20 ENCOUNTER — TELEPHONE (OUTPATIENT)
Dept: INTERNAL MEDICINE CLINIC | Facility: CLINIC | Age: 53
End: 2023-12-20

## 2023-12-20 VITALS
OXYGEN SATURATION: 100 % | HEART RATE: 72 BPM | WEIGHT: 148 LBS | RESPIRATION RATE: 16 BRPM | SYSTOLIC BLOOD PRESSURE: 153 MMHG | BODY MASS INDEX: 23.23 KG/M2 | DIASTOLIC BLOOD PRESSURE: 98 MMHG | HEIGHT: 67 IN | TEMPERATURE: 99 F

## 2023-12-20 DIAGNOSIS — R03.0 ELEVATED BLOOD PRESSURE READING: Primary | ICD-10-CM

## 2023-12-20 DIAGNOSIS — F41.9 ANXIETY: ICD-10-CM

## 2023-12-20 PROCEDURE — 99284 EMERGENCY DEPT VISIT MOD MDM: CPT

## 2023-12-20 PROCEDURE — 99283 EMERGENCY DEPT VISIT LOW MDM: CPT

## 2023-12-20 RX ORDER — LOSARTAN POTASSIUM 25 MG/1
25 TABLET ORAL DAILY
Qty: 30 TABLET | Refills: 0 | Status: SHIPPED | OUTPATIENT
Start: 2023-12-20

## 2023-12-20 RX ORDER — LOSARTAN POTASSIUM 50 MG/1
25 TABLET ORAL DAILY
Status: DISCONTINUED | OUTPATIENT
Start: 2023-12-20 | End: 2023-12-20

## 2023-12-20 NOTE — TELEPHONE ENCOUNTER
Called patient who states BP has been running high for the past week 160/120  lowest 138/92. She does exercise , When she got home today she waited half an hour and took her BP it was 159/120 , also on Sunday she had a headache.  RN instructed patient to be evaluated in ER - she will go FU12/21

## 2023-12-20 NOTE — TELEPHONE ENCOUNTER
Pt called, her blood pressure has been high for the last few days - ranges 150s over 120   with wrist monitor  She generally does not have headaches but she did on Sunday     Pt is under a lot of stress due to parents health issues and also her own

## 2023-12-20 NOTE — ED INITIAL ASSESSMENT (HPI)
Patient reports elevated /120 at home, does not take BP meds. Has been high for 5 days. Saw PCP last Friday and it was 160/94, so she has been checking her BP at home since. Patient endorses she has been under a lot of stress.

## 2023-12-21 NOTE — DISCHARGE INSTRUCTIONS
You may call [facility] at [phone]. You can make an appointment to be seen at their facility, evaluated and provided with referrals for counseling services. Bring your blood pressure cuff to your next physician appointment. Check your blood pressure daily and keep a diary.

## 2023-12-21 NOTE — TELEPHONE ENCOUNTER
Pt lynnette'd at ER yesterday. Started on Losartan 25 mg daily. To FD:   Please contact pt to schedule ER f/up for blood pressure. Thanks!

## 2023-12-26 ENCOUNTER — LAB ENCOUNTER (OUTPATIENT)
Dept: LAB | Facility: HOSPITAL | Age: 53
End: 2023-12-26
Attending: INTERNAL MEDICINE
Payer: COMMERCIAL

## 2023-12-26 DIAGNOSIS — Z00.00 ANNUAL PHYSICAL EXAM: ICD-10-CM

## 2023-12-26 LAB
CHOLEST SERPL-MCNC: 181 MG/DL (ref ?–200)
EST. AVERAGE GLUCOSE BLD GHB EST-MCNC: 117 MG/DL (ref 68–126)
FASTING PATIENT LIPID ANSWER: YES
HBA1C MFR BLD: 5.7 % (ref ?–5.7)
HDLC SERPL-MCNC: 70 MG/DL (ref 40–59)
LDLC SERPL CALC-MCNC: 101 MG/DL (ref ?–100)
NONHDLC SERPL-MCNC: 111 MG/DL (ref ?–130)
TRIGL SERPL-MCNC: 51 MG/DL (ref 30–149)
VLDLC SERPL CALC-MCNC: 8 MG/DL (ref 0–30)

## 2023-12-26 PROCEDURE — 83036 HEMOGLOBIN GLYCOSYLATED A1C: CPT

## 2023-12-26 PROCEDURE — 36415 COLL VENOUS BLD VENIPUNCTURE: CPT

## 2023-12-26 PROCEDURE — 80061 LIPID PANEL: CPT

## 2023-12-27 ENCOUNTER — OFFICE VISIT (OUTPATIENT)
Dept: INTERNAL MEDICINE CLINIC | Facility: CLINIC | Age: 53
End: 2023-12-27

## 2023-12-27 VITALS
TEMPERATURE: 98 F | DIASTOLIC BLOOD PRESSURE: 86 MMHG | WEIGHT: 148 LBS | HEIGHT: 67 IN | HEART RATE: 86 BPM | SYSTOLIC BLOOD PRESSURE: 140 MMHG | BODY MASS INDEX: 23.23 KG/M2

## 2023-12-27 DIAGNOSIS — I10 PRIMARY HYPERTENSION: Primary | ICD-10-CM

## 2023-12-27 DIAGNOSIS — F41.9 ANXIETY: ICD-10-CM

## 2023-12-27 PROCEDURE — 99214 OFFICE O/P EST MOD 30 MIN: CPT | Performed by: INTERNAL MEDICINE

## 2023-12-27 PROCEDURE — 3079F DIAST BP 80-89 MM HG: CPT | Performed by: INTERNAL MEDICINE

## 2023-12-27 PROCEDURE — 3077F SYST BP >= 140 MM HG: CPT | Performed by: INTERNAL MEDICINE

## 2023-12-27 PROCEDURE — 3008F BODY MASS INDEX DOCD: CPT | Performed by: INTERNAL MEDICINE

## 2023-12-27 RX ORDER — AMLODIPINE BESYLATE 2.5 MG/1
2.5 TABLET ORAL DAILY
Qty: 30 TABLET | Refills: 1 | Status: SHIPPED | OUTPATIENT
Start: 2023-12-27

## 2024-01-18 ENCOUNTER — OFFICE VISIT (OUTPATIENT)
Dept: OTOLARYNGOLOGY | Facility: CLINIC | Age: 54
End: 2024-01-18
Payer: COMMERCIAL

## 2024-01-18 ENCOUNTER — LAB ENCOUNTER (OUTPATIENT)
Dept: LAB | Facility: HOSPITAL | Age: 54
End: 2024-01-18
Attending: SPECIALIST
Payer: COMMERCIAL

## 2024-01-18 VITALS — HEIGHT: 67 IN | BODY MASS INDEX: 23.23 KG/M2 | WEIGHT: 148 LBS

## 2024-01-18 DIAGNOSIS — H61.21 CERUMEN DEBRIS ON TYMPANIC MEMBRANE OF RIGHT EAR: ICD-10-CM

## 2024-01-18 DIAGNOSIS — R59.1 HEAD AND NECK LYMPHADENOPATHY: Primary | ICD-10-CM

## 2024-01-18 DIAGNOSIS — R91.8 LUNG NODULES: ICD-10-CM

## 2024-01-18 DIAGNOSIS — J34.3 NASAL TURBINATE HYPERTROPHY: ICD-10-CM

## 2024-01-18 LAB — ERYTHROCYTE [SEDIMENTATION RATE] IN BLOOD: 16 MM/HR

## 2024-01-18 PROCEDURE — 99214 OFFICE O/P EST MOD 30 MIN: CPT | Performed by: SPECIALIST

## 2024-01-18 PROCEDURE — 85652 RBC SED RATE AUTOMATED: CPT | Performed by: SPECIALIST

## 2024-01-18 PROCEDURE — 3008F BODY MASS INDEX DOCD: CPT | Performed by: SPECIALIST

## 2024-01-18 PROCEDURE — 36415 COLL VENOUS BLD VENIPUNCTURE: CPT | Performed by: SPECIALIST

## 2024-01-19 ENCOUNTER — TELEPHONE (OUTPATIENT)
Dept: INTERNAL MEDICINE CLINIC | Facility: CLINIC | Age: 54
End: 2024-01-19

## 2024-01-19 DIAGNOSIS — L92.9 NON-CASEATING GRANULOMA: Primary | ICD-10-CM

## 2024-01-19 DIAGNOSIS — R22.2 SUBCUTANEOUS NODULE OF ABDOMINAL WALL: ICD-10-CM

## 2024-01-19 DIAGNOSIS — R59.0 MEDIASTINAL LYMPHADENOPATHY: ICD-10-CM

## 2024-01-19 NOTE — TELEPHONE ENCOUNTER
Spoke with pt--bronch results with noncaseating granuloma  Has cervical LAD; skin nodules   Has ophthalmology evaluation next week  Advised skin nodule evaluation-spoke with Dr. Ray's office, they will call her to schedule.

## 2024-01-19 NOTE — PATIENT INSTRUCTIONS
Cerumen was fully cleaned from your right ear.  You had head and neck lymphadenopathy as well as lung nodules.  A sedimentation rate was negative.  This makes it unlikely that this is secondary to autoimmune disease.  You can continue Flonase and Singulair for your nasal congestion.  Please notify me when your repeat CT is done so I can check these results.
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
## 2024-01-22 ENCOUNTER — TELEPHONE (OUTPATIENT)
Dept: INTERNAL MEDICINE CLINIC | Facility: CLINIC | Age: 54
End: 2024-01-22

## 2024-01-22 DIAGNOSIS — R07.89 ATYPICAL CHEST PAIN: ICD-10-CM

## 2024-01-22 DIAGNOSIS — L92.9 NON-CASEATING GRANULOMA: Primary | ICD-10-CM

## 2024-01-24 ENCOUNTER — OFFICE VISIT (OUTPATIENT)
Dept: DERMATOLOGY CLINIC | Facility: CLINIC | Age: 54
End: 2024-01-24
Payer: COMMERCIAL

## 2024-01-24 ENCOUNTER — LAB ENCOUNTER (OUTPATIENT)
Dept: LAB | Age: 54
End: 2024-01-24
Attending: STUDENT IN AN ORGANIZED HEALTH CARE EDUCATION/TRAINING PROGRAM
Payer: COMMERCIAL

## 2024-01-24 DIAGNOSIS — Z51.81 MEDICATION MONITORING ENCOUNTER: Primary | ICD-10-CM

## 2024-01-24 DIAGNOSIS — D86.9 SARCOID: ICD-10-CM

## 2024-01-24 DIAGNOSIS — R59.0 MEDIASTINAL LYMPHADENOPATHY: ICD-10-CM

## 2024-01-24 DIAGNOSIS — Z51.81 MEDICATION MONITORING ENCOUNTER: ICD-10-CM

## 2024-01-24 DIAGNOSIS — L92.9 NON-CASEATING GRANULOMA: ICD-10-CM

## 2024-01-24 DIAGNOSIS — R22.2 SUBCUTANEOUS NODULE OF ABDOMINAL WALL: ICD-10-CM

## 2024-01-24 DIAGNOSIS — R07.89 ATYPICAL CHEST PAIN: ICD-10-CM

## 2024-01-24 LAB
ALBUMIN SERPL-MCNC: 4.3 G/DL (ref 3.2–4.8)
ALBUMIN/GLOB SERPL: 1.2 {RATIO} (ref 1–2)
ALP LIVER SERPL-CCNC: 83 U/L
ALT SERPL-CCNC: 22 U/L
ANION GAP SERPL CALC-SCNC: 9 MMOL/L (ref 0–18)
AST SERPL-CCNC: 24 U/L (ref ?–34)
ATRIAL RATE: 74 BPM
BASOPHILS # BLD AUTO: 0.03 X10(3) UL (ref 0–0.2)
BASOPHILS NFR BLD AUTO: 0.9 %
BILIRUB SERPL-MCNC: 1 MG/DL (ref 0.3–1.2)
BILIRUB UR QL: NEGATIVE
BNP SERPL-MCNC: 38 PG/ML
BUN BLD-MCNC: 10 MG/DL (ref 9–23)
BUN/CREAT SERPL: 11.6 (ref 10–20)
CALCIUM BLD-MCNC: 10.5 MG/DL (ref 8.7–10.4)
CHLORIDE SERPL-SCNC: 105 MMOL/L (ref 98–112)
CLARITY UR: CLEAR
CO2 SERPL-SCNC: 26 MMOL/L (ref 21–32)
CREAT BLD-MCNC: 0.86 MG/DL
DEPRECATED RDW RBC AUTO: 44.6 FL (ref 35.1–46.3)
EGFRCR SERPLBLD CKD-EPI 2021: 80 ML/MIN/1.73M2 (ref 60–?)
EOSINOPHIL # BLD AUTO: 0.02 X10(3) UL (ref 0–0.7)
EOSINOPHIL NFR BLD AUTO: 0.6 %
ERYTHROCYTE [DISTWIDTH] IN BLOOD BY AUTOMATED COUNT: 12.7 % (ref 11–15)
FASTING STATUS PATIENT QL REPORTED: YES
GLOBULIN PLAS-MCNC: 3.5 G/DL (ref 2.8–4.4)
GLUCOSE BLD-MCNC: 78 MG/DL (ref 70–99)
GLUCOSE UR-MCNC: NORMAL MG/DL
HCT VFR BLD AUTO: 37.9 %
HGB BLD-MCNC: 13 G/DL
IGA SERPL-MCNC: 163.3 MG/DL (ref 40–350)
IGM SERPL-MCNC: 89.2 MG/DL (ref 50–300)
IMM GRANULOCYTES # BLD AUTO: 0 X10(3) UL (ref 0–1)
IMM GRANULOCYTES NFR BLD: 0 %
IMMUNOGLOBULIN PNL SER-MCNC: 1947 MG/DL (ref 650–1600)
KETONES UR-MCNC: 10 MG/DL
LEUKOCYTE ESTERASE UR QL STRIP.AUTO: NEGATIVE
LYMPHOCYTES # BLD AUTO: 1.55 X10(3) UL (ref 1–4)
LYMPHOCYTES NFR BLD AUTO: 44.9 %
MCH RBC QN AUTO: 32.4 PG (ref 26–34)
MCHC RBC AUTO-ENTMCNC: 34.3 G/DL (ref 31–37)
MCV RBC AUTO: 94.5 FL
MONOCYTES # BLD AUTO: 0.18 X10(3) UL (ref 0.1–1)
MONOCYTES NFR BLD AUTO: 5.2 %
NEUTROPHILS # BLD AUTO: 1.67 X10 (3) UL (ref 1.5–7.7)
NEUTROPHILS # BLD AUTO: 1.67 X10(3) UL (ref 1.5–7.7)
NEUTROPHILS NFR BLD AUTO: 48.4 %
NITRITE UR QL STRIP.AUTO: NEGATIVE
OSMOLALITY SERPL CALC.SUM OF ELEC: 288 MOSM/KG (ref 275–295)
P AXIS: 66 DEGREES
P-R INTERVAL: 140 MS
PH UR: 6 [PH] (ref 5–8)
PLATELET # BLD AUTO: 153 10(3)UL (ref 150–450)
POTASSIUM SERPL-SCNC: 3.9 MMOL/L (ref 3.5–5.1)
PROT SERPL-MCNC: 7.8 G/DL (ref 5.7–8.2)
PROT UR-MCNC: NEGATIVE MG/DL
Q-T INTERVAL: 372 MS
QRS DURATION: 78 MS
QTC CALCULATION (BEZET): 412 MS
R AXIS: 62 DEGREES
RBC # BLD AUTO: 4.01 X10(6)UL
RHEUMATOID FACT SERPL-ACNC: <10 IU/ML (ref ?–14)
SODIUM SERPL-SCNC: 140 MMOL/L (ref 136–145)
SP GR UR STRIP: 1.02 (ref 1–1.03)
T AXIS: 46 DEGREES
UROBILINOGEN UR STRIP-ACNC: NORMAL
VENTRICULAR RATE: 74 BPM
WBC # BLD AUTO: 3.5 X10(3) UL (ref 4–11)

## 2024-01-24 PROCEDURE — 93005 ELECTROCARDIOGRAM TRACING: CPT

## 2024-01-24 PROCEDURE — 83880 ASSAY OF NATRIURETIC PEPTIDE: CPT

## 2024-01-24 PROCEDURE — 85025 COMPLETE CBC W/AUTO DIFF WBC: CPT

## 2024-01-24 PROCEDURE — 82164 ANGIOTENSIN I ENZYME TEST: CPT

## 2024-01-24 PROCEDURE — 83516 IMMUNOASSAY NONANTIBODY: CPT

## 2024-01-24 PROCEDURE — 81001 URINALYSIS AUTO W/SCOPE: CPT | Performed by: INTERNAL MEDICINE

## 2024-01-24 PROCEDURE — 86431 RHEUMATOID FACTOR QUANT: CPT

## 2024-01-24 PROCEDURE — 99204 OFFICE O/P NEW MOD 45 MIN: CPT | Performed by: STUDENT IN AN ORGANIZED HEALTH CARE EDUCATION/TRAINING PROGRAM

## 2024-01-24 PROCEDURE — 36415 COLL VENOUS BLD VENIPUNCTURE: CPT

## 2024-01-24 PROCEDURE — 82784 ASSAY IGA/IGD/IGG/IGM EACH: CPT

## 2024-01-24 PROCEDURE — 86225 DNA ANTIBODY NATIVE: CPT

## 2024-01-24 PROCEDURE — 85060 BLOOD SMEAR INTERPRETATION: CPT

## 2024-01-24 PROCEDURE — 83520 IMMUNOASSAY QUANT NOS NONAB: CPT

## 2024-01-24 PROCEDURE — 80053 COMPREHEN METABOLIC PANEL: CPT

## 2024-01-24 PROCEDURE — 82955 ASSAY OF G6PD ENZYME: CPT

## 2024-01-24 PROCEDURE — 93010 ELECTROCARDIOGRAM REPORT: CPT | Performed by: INTERNAL MEDICINE

## 2024-01-24 PROCEDURE — 86038 ANTINUCLEAR ANTIBODIES: CPT

## 2024-01-24 PROCEDURE — 86037 ANCA TITER EACH ANTIBODY: CPT

## 2024-01-24 RX ORDER — AMLODIPINE BESYLATE 2.5 MG/1
TABLET ORAL
COMMUNITY
Start: 2024-01-23
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
## 2024-01-25 ENCOUNTER — OFFICE VISIT (OUTPATIENT)
Dept: OPHTHALMOLOGY | Facility: CLINIC | Age: 54
End: 2024-01-25
Payer: COMMERCIAL

## 2024-01-25 DIAGNOSIS — H40.003 GLAUCOMA SUSPECT OF BOTH EYES: Primary | ICD-10-CM

## 2024-01-25 DIAGNOSIS — H52.4 HYPEROPIA WITH PRESBYOPIA OF BOTH EYES: ICD-10-CM

## 2024-01-25 DIAGNOSIS — H43.392 FLOATERS, LEFT: ICD-10-CM

## 2024-01-25 DIAGNOSIS — H52.03 HYPEROPIA WITH PRESBYOPIA OF BOTH EYES: ICD-10-CM

## 2024-01-25 DIAGNOSIS — H46.9 OPTIC NEUROPATHY, LEFT: ICD-10-CM

## 2024-01-25 LAB
ACE: 80 U/L
DSDNA IGG SERPL IA-ACNC: 0.9 IU/ML
ENA AB SER QL IA: 0.4 UG/L
ENA AB SER QL IA: NEGATIVE

## 2024-01-25 PROCEDURE — 92015 DETERMINE REFRACTIVE STATE: CPT | Performed by: OPHTHALMOLOGY

## 2024-01-25 PROCEDURE — 92014 COMPRE OPH EXAM EST PT 1/>: CPT | Performed by: OPHTHALMOLOGY

## 2024-01-25 NOTE — PATIENT INSTRUCTIONS
Glaucoma suspect  Discussed with patient that  is a glaucoma suspect based on increased cupping of the optic nerves in both eyes.   Will not start medication, but will continue to observe.  Patient verbalized understanding.    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  All signs and symptoms of retinal detachment/tears explained in detail.    Patient instructed to call the office if they experience increase in floaters, increase in flashes of light, loss of vision or curtain or veil effect.       Hyperopia with presbyopia  New glasses Rx given today      NO ocular sarcoidosis

## 2024-01-25 NOTE — ASSESSMENT & PLAN NOTE
Discussed with patient that  is a glaucoma suspect based on increased cupping of the optic nerves in both eyes.   Will not start medication, but will continue to observe.  Patient verbalized understanding.    Will see patient in 1 year for a complete exam and photos

## 2024-01-26 ENCOUNTER — TELEPHONE (OUTPATIENT)
Dept: INTERNAL MEDICINE CLINIC | Facility: CLINIC | Age: 54
End: 2024-01-26

## 2024-01-27 LAB
ANTI-MPO ANTIBODIES: <0.2 UNITS
ANTI-PR3 ANTIBODIES: <0.2 UNITS
G6PD QN: 268 U/10E12 RBC
RBC: 3.98 X10E6/UL

## 2024-01-28 LAB — TRYPTASE: 6.3 UG/L

## 2024-01-29 RX ORDER — HYDROXYCHLOROQUINE SULFATE 200 MG/1
TABLET, FILM COATED ORAL
Qty: 135 TABLET | Refills: 1 | Status: SHIPPED | OUTPATIENT
Start: 2024-01-29

## 2024-01-29 NOTE — TELEPHONE ENCOUNTER
Called patient and relayed MD's message. Patient asking what additional testing should she do? Do not see any outstanding imaging or lab orders in epic.    To Dr. Riley to please advise--

## 2024-01-29 NOTE — TELEPHONE ENCOUNTER
Please notify pt that overall her blood work was normal. I still think that we should continue with the additional testing and see rheumatology for evaluation.

## 2024-01-31 NOTE — TELEPHONE ENCOUNTER
To Dr Riley.    [General Appearance - Well Developed] : well developed [Normal Appearance] : normal appearance [Well Groomed] : well groomed [General Appearance - Well Nourished] : well nourished [No Deformities] : no deformities [General Appearance - In No Acute Distress] : no acute distress [Heart Rate And Rhythm] : heart rate and rhythm were normal [Heart Sounds] : normal S1 and S2 [Murmurs] : no murmurs present [Edema] : no peripheral edema present [] : no respiratory distress [Respiration, Rhythm And Depth] : normal respiratory rhythm and effort [Exaggerated Use Of Accessory Muscles For Inspiration] : no accessory muscle use [Auscultation Breath Sounds / Voice Sounds] : lungs were clear to auscultation bilaterally [Left Infraclavicular] : left infraclavicular area [Clean] : clean [Dry] : dry [Well-Healed] : well-healed [Abdomen Soft] : soft [Nail Clubbing] : no clubbing of the fingernails [FreeTextEntry1] : uses rollator

## 2024-02-05 ENCOUNTER — TELEPHONE (OUTPATIENT)
Dept: RHEUMATOLOGY | Facility: CLINIC | Age: 54
End: 2024-02-05

## 2024-02-05 ENCOUNTER — HOSPITAL ENCOUNTER (OUTPATIENT)
Dept: CV DIAGNOSTICS | Facility: HOSPITAL | Age: 54
Discharge: HOME | End: 2024-02-05
Attending: INTERNAL MEDICINE
Payer: COMMERCIAL

## 2024-02-05 DIAGNOSIS — R07.89 ATYPICAL CHEST PAIN: ICD-10-CM

## 2024-02-05 DIAGNOSIS — L92.9 NON-CASEATING GRANULOMA: ICD-10-CM

## 2024-02-05 PROCEDURE — 93306 TTE W/DOPPLER COMPLETE: CPT | Performed by: INTERNAL MEDICINE

## 2024-02-06 ENCOUNTER — PATIENT MESSAGE (OUTPATIENT)
Dept: INTERNAL MEDICINE CLINIC | Facility: CLINIC | Age: 54
End: 2024-02-06

## 2024-02-06 NOTE — TELEPHONE ENCOUNTER
From: Purvi Sidhu  Sent: 2/6/2024 11:47 AM CST  To: [facility] Clinical Staff  Subject: results    Okay. Thank you Dr. Riley.

## 2024-02-08 ENCOUNTER — APPOINTMENT (OUTPATIENT)
Dept: CT IMAGING | Facility: HOSPITAL | Age: 54
End: 2024-02-08
Attending: EMERGENCY MEDICINE
Payer: COMMERCIAL

## 2024-02-08 ENCOUNTER — HOSPITAL ENCOUNTER (OUTPATIENT)
Facility: HOSPITAL | Age: 54
Setting detail: OBSERVATION
Discharge: HOME | End: 2024-02-09
Attending: EMERGENCY MEDICINE | Admitting: HOSPITALIST
Payer: COMMERCIAL

## 2024-02-08 DIAGNOSIS — R42 LIGHTHEADEDNESS: ICD-10-CM

## 2024-02-08 DIAGNOSIS — R79.89 ELEVATED TROPONIN: Primary | ICD-10-CM

## 2024-02-08 LAB
ALBUMIN SERPL-MCNC: 3.9 G/DL (ref 3.4–5)
ALBUMIN/GLOB SERPL: 1 {RATIO} (ref 1–2)
ALP LIVER SERPL-CCNC: 77 U/L
ALT SERPL-CCNC: 22 U/L
ANION GAP SERPL CALC-SCNC: 5 MMOL/L (ref 0–18)
AST SERPL-CCNC: 24 U/L (ref 15–37)
BASOPHILS # BLD AUTO: 0.02 X10(3) UL (ref 0–0.2)
BASOPHILS NFR BLD AUTO: 0.4 %
BILIRUB SERPL-MCNC: 0.7 MG/DL (ref 0.1–2)
BUN BLD-MCNC: 10 MG/DL (ref 9–23)
CALCIUM BLD-MCNC: 10.1 MG/DL (ref 8.5–10.1)
CHLORIDE SERPL-SCNC: 110 MMOL/L (ref 98–112)
CHOLEST SERPL-MCNC: 179 MG/DL (ref ?–200)
CO2 SERPL-SCNC: 26 MMOL/L (ref 21–32)
CREAT BLD-MCNC: 0.88 MG/DL
EGFRCR SERPLBLD CKD-EPI 2021: 78 ML/MIN/1.73M2 (ref 60–?)
EOSINOPHIL # BLD AUTO: 0.04 X10(3) UL (ref 0–0.7)
EOSINOPHIL NFR BLD AUTO: 0.8 %
ERYTHROCYTE [DISTWIDTH] IN BLOOD BY AUTOMATED COUNT: 12.7 %
GLOBULIN PLAS-MCNC: 3.9 G/DL (ref 2.8–4.4)
GLUCOSE BLD-MCNC: 87 MG/DL (ref 70–99)
HCT VFR BLD AUTO: 39.2 %
HDLC SERPL-MCNC: 69 MG/DL (ref 40–59)
HGB BLD-MCNC: 13.2 G/DL
IMM GRANULOCYTES # BLD AUTO: 0 X10(3) UL (ref 0–1)
IMM GRANULOCYTES NFR BLD: 0 %
LDLC SERPL CALC-MCNC: 97 MG/DL (ref ?–100)
LYMPHOCYTES # BLD AUTO: 1.96 X10(3) UL (ref 1–4)
LYMPHOCYTES NFR BLD AUTO: 41.6 %
MCH RBC QN AUTO: 31.8 PG (ref 26–34)
MCHC RBC AUTO-ENTMCNC: 33.7 G/DL (ref 31–37)
MCV RBC AUTO: 94.5 FL
MONOCYTES # BLD AUTO: 0.21 X10(3) UL (ref 0.1–1)
MONOCYTES NFR BLD AUTO: 4.5 %
NEUTROPHILS # BLD AUTO: 2.48 X10 (3) UL (ref 1.5–7.7)
NEUTROPHILS # BLD AUTO: 2.48 X10(3) UL (ref 1.5–7.7)
NEUTROPHILS NFR BLD AUTO: 52.7 %
NONHDLC SERPL-MCNC: 110 MG/DL (ref ?–130)
OSMOLALITY SERPL CALC.SUM OF ELEC: 290 MOSM/KG (ref 275–295)
PLATELET # BLD AUTO: 139 10(3)UL (ref 150–450)
POTASSIUM SERPL-SCNC: 3.8 MMOL/L (ref 3.5–5.1)
PROT SERPL-MCNC: 7.8 G/DL (ref 6.4–8.2)
RBC # BLD AUTO: 4.15 X10(6)UL
SODIUM SERPL-SCNC: 141 MMOL/L (ref 136–145)
TRIGL SERPL-MCNC: 71 MG/DL (ref 30–149)
TROPONIN I SERPL HS-MCNC: 134 NG/L
TROPONIN I SERPL HS-MCNC: 142 NG/L
VLDLC SERPL CALC-MCNC: 12 MG/DL (ref 0–30)
WBC # BLD AUTO: 4.7 X10(3) UL (ref 4–11)

## 2024-02-08 PROCEDURE — 70450 CT HEAD/BRAIN W/O DYE: CPT | Performed by: EMERGENCY MEDICINE

## 2024-02-08 PROCEDURE — 99223 1ST HOSP IP/OBS HIGH 75: CPT | Performed by: HOSPITALIST

## 2024-02-08 RX ORDER — MELATONIN
3 NIGHTLY PRN
Status: DISCONTINUED | OUTPATIENT
Start: 2024-02-08 | End: 2024-02-09

## 2024-02-08 RX ORDER — POLYETHYLENE GLYCOL 3350 17 G/17G
17 POWDER, FOR SOLUTION ORAL DAILY PRN
Status: DISCONTINUED | OUTPATIENT
Start: 2024-02-08 | End: 2024-02-09

## 2024-02-08 RX ORDER — PROCHLORPERAZINE EDISYLATE 5 MG/ML
5 INJECTION INTRAMUSCULAR; INTRAVENOUS EVERY 8 HOURS PRN
Status: DISCONTINUED | OUTPATIENT
Start: 2024-02-08 | End: 2024-02-09

## 2024-02-08 RX ORDER — ONDANSETRON 2 MG/ML
4 INJECTION INTRAMUSCULAR; INTRAVENOUS EVERY 6 HOURS PRN
Status: DISCONTINUED | OUTPATIENT
Start: 2024-02-08 | End: 2024-02-09

## 2024-02-08 RX ORDER — BISACODYL 10 MG
10 SUPPOSITORY, RECTAL RECTAL
Status: DISCONTINUED | OUTPATIENT
Start: 2024-02-08 | End: 2024-02-09

## 2024-02-08 RX ORDER — POTASSIUM CHLORIDE 20 MEQ/1
40 TABLET, EXTENDED RELEASE ORAL ONCE
Status: COMPLETED | OUTPATIENT
Start: 2024-02-08 | End: 2024-02-08

## 2024-02-08 RX ORDER — AMLODIPINE BESYLATE 5 MG/1
5 TABLET ORAL DAILY
Status: DISCONTINUED | OUTPATIENT
Start: 2024-02-08 | End: 2024-02-09

## 2024-02-08 RX ORDER — ACETAMINOPHEN 500 MG
500 TABLET ORAL EVERY 4 HOURS PRN
Status: DISCONTINUED | OUTPATIENT
Start: 2024-02-08 | End: 2024-02-09

## 2024-02-08 RX ORDER — HYDROXYCHLOROQUINE SULFATE 200 MG/1
200 TABLET, FILM COATED ORAL 2 TIMES DAILY
Status: DISCONTINUED | OUTPATIENT
Start: 2024-02-08 | End: 2024-02-08

## 2024-02-08 RX ORDER — HEPARIN SODIUM 5000 [USP'U]/ML
5000 INJECTION, SOLUTION INTRAVENOUS; SUBCUTANEOUS EVERY 8 HOURS SCHEDULED
Status: DISCONTINUED | OUTPATIENT
Start: 2024-02-08 | End: 2024-02-09

## 2024-02-08 RX ORDER — ENEMA 19; 7 G/133ML; G/133ML
1 ENEMA RECTAL ONCE AS NEEDED
Status: DISCONTINUED | OUTPATIENT
Start: 2024-02-08 | End: 2024-02-09

## 2024-02-08 RX ORDER — SENNOSIDES 8.6 MG
17.2 TABLET ORAL NIGHTLY PRN
Status: DISCONTINUED | OUTPATIENT
Start: 2024-02-08 | End: 2024-02-09

## 2024-02-08 NOTE — ED INITIAL ASSESSMENT (HPI)
Dizziness and tingling in both hands and feet since Monday . Denies any pain . No facial droop or arm drift . Unable to raise the left eye brow . No numbness  in face good arm strength. Patient is ambulatory with steady gait.

## 2024-02-08 NOTE — ED QUICK NOTES
Orders for admission, patient is aware of plan and ready to go upstairs. Any questions, please call ED RN Jennifer at extension [phone].     Patient Covid vaccination status: Fully vaccinated     COVID Test Ordered in ED: None    COVID Suspicion at Admission: N/A    Running Infusions:  None    Mental Status/LOC at time of transport: A&Ox3    Other pertinent information: Incidentally found a positive troponin. Denies any chest pain or SOB  CIWA score: N/A   NIH score:  0
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
## 2024-02-09 VITALS
TEMPERATURE: 97 F | BODY MASS INDEX: 21.97 KG/M2 | HEIGHT: 67 IN | RESPIRATION RATE: 18 BRPM | OXYGEN SATURATION: 97 % | DIASTOLIC BLOOD PRESSURE: 81 MMHG | HEART RATE: 74 BPM | WEIGHT: 140 LBS | SYSTOLIC BLOOD PRESSURE: 122 MMHG

## 2024-02-09 LAB
ATRIAL RATE: 81 BPM
CRP SERPL-MCNC: <0.29 MG/DL (ref ?–0.3)
ERYTHROCYTE [SEDIMENTATION RATE] IN BLOOD: 6 MM/HR
P AXIS: 76 DEGREES
P-R INTERVAL: 146 MS
POTASSIUM SERPL-SCNC: 4.4 MMOL/L (ref 3.5–5.1)
Q-T INTERVAL: 358 MS
QRS DURATION: 80 MS
QTC CALCULATION (BEZET): 415 MS
R AXIS: 54 DEGREES
T AXIS: 52 DEGREES
TROPONIN I SERPL HS-MCNC: 146 NG/L
VENTRICULAR RATE: 81 BPM

## 2024-02-09 PROCEDURE — 99239 HOSP IP/OBS DSCHRG MGMT >30: CPT | Performed by: INTERNAL MEDICINE

## 2024-02-09 RX ORDER — AMLODIPINE BESYLATE 5 MG/1
5 TABLET ORAL DAILY
Qty: 30 TABLET | Refills: 0 | Status: SHIPPED | OUTPATIENT
Start: 2024-02-10 | End: 2024-02-14

## 2024-02-09 NOTE — SPIRITUAL CARE NOTE
Spiritual Care Visit Note    Patient Name: Purvi Sidhu Date of Spiritual Care Visit: 24   : 3/18/1970 Primary Dx: Elevated troponin       Referred By: Referral From: Other (Comment) (Epic consult.)    Spiritual Care Taxonomy:    Intended Effects: Demonstrate caring and concern    Methods: Offer spiritual/Rastafari support    Interventions: Acknowledge current situation;Active listening;Ask guided questions;Ask guided questions about herminia;[place];Provide compassionate touch;Prayer for healing    Visit Type/Summary:     - Spiritual Care: Responded to a request for spiritual care and assessed the patient for spiritual care needs. Consulted with RN prior to visit. Offered empathic listening and emotional support. Provided support for Patient's spiritual/Rastafari requests. Offered prayer.    Spiritual Care support can be requested via an Epic consult. For urgent/immediate needs, please contact the On Call  at: Jerem[facility]: ext [phone]    Sg Garcia MDiv

## 2024-02-09 NOTE — PLAN OF CARE
NURSING ADMISSION NOTE      Patient admitted via Cart  Oriented to room.  Safety precautions initiated.  Bed in low position.  Call light in reach.    Received patient awake and alert x4. Still has tingling to bilateral fingers  and toes. Clear lungs. NSR on tele. Denies any chest pain. Patient claimed she is in a lot of stress at home- her parents are sick, her father was diagnosed w/ pancreatic CA and her mom is in the hospital. Denies any dizziness at this time. Potassium was replaced.   0600: Claimed tingling to both fingers and toes are gone.     Problem: CARDIOVASCULAR - ADULT  Goal: Absence of cardiac arrhythmias or at baseline  Description: INTERVENTIONS:  - Continuous cardiac monitoring, monitor vital signs, obtain 12 lead EKG if indicated  - Monitor electrolytes and administer replacement therapy as ordered  Outcome: Progressing     Problem: METABOLIC/FLUID AND ELECTROLYTES - ADULT  Goal: Electrolytes maintained within normal limits  Description: INTERVENTIONS:  - Monitor labs and rhythm and assess patient for signs and symptoms of electrolyte imbalances  - Administer electrolyte replacement as ordered  - Monitor response to electrolyte replacements, including rhythm and repeat lab results as appropriate  Outcome: Progressing  Goal: Hemodynamic stability and optimal renal function maintained  Description: INTERVENTIONS:  - Monitor labs and assess for signs and symptoms of volume excess or deficit  - Monitor intake, output and patient weight  - Monitor urine specific gravity, serum osmolarity and serum sodium as indicated or ordered  - Monitor response to interventions for patient's volume status, including labs, urine output, blood pressure (other measures as available)  - Encourage oral intake as appropriate  - Instruct patient on fluid and nutrition restrictions as appropriate  Outcome: Progressing     Problem: DISCHARGE PLANNING  Goal: Discharge to home or other facility with appropriate  resources  Description: INTERVENTIONS:  - Identify barriers to discharge w/pt and caregiver  - Include patient/family/discharge partner in discharge planning  - Arrange for needed discharge resources and transportation as appropriate  - Identify discharge learning needs (meds, wound care, etc)  - Consider post-discharge preferences of patient/family/discharge partner  - Assess patient's ability to be responsible for managing their own health  - Refer to Case Management Department for coordinating discharge planning if the patient needs post-hospital services based on physician/LIP order or complex needs related to functional status, cognitive ability or social support system  Outcome: Progressing     Problem: ANXIETY  Goal: Will report anxiety at manageable levels  Description: INTERVENTIONS:  - Administer medication as ordered  - Teach and rehearse alternative coping skills  - Provide emotional support with 1:1 interaction with staff  Outcome: Progressing

## 2024-02-09 NOTE — DISCHARGE SUMMARY
[facility]     Purvi Sidhu Patient Status:  Observation    3/18/1970 MRN [id number]   [facility] 0SW-A Attending Yogesh Stovall MD   Hosp Day # 0 PCP Brenda Riley DO     Date of Admission: 2024  Date of Discharge:   2024      Discharge Disposition: Home or Self Care    Discharge Diagnosis:  Sarcoidosis  Elevated Troponin      History of Present Illness: Purvi Sidhu is a 53 year old female with history of optic neuropathy pituitary adenoma presents to emergency room complaining of dizziness and also tingling in both hands and feet for 2 days.  Patient also has history of sarcoidosis that was recently diagnosed.  She woke up this morning feeling DEC and off balance.  After eating lunch she started to have tingling paresthesias in both feet and hands and she presented to be evaluated.  She denies any focal weakness or heaviness she denies any slurred speech or headache fever or chills.  Workup done in emergency room included CT brain which was unremarkable.  Initial troponin was elevated at 142 and a repeat 1 hour still high at 134.  EKG shows no acute ischemia.  Patient did have an echocardiogram few days ago and it was unremarkable.     Brief Synopsis: Pt was admitted and monitored on telemetry. Her symptoms were atypical for ACS. Her troponins may be related to recent diagnosis of sarcoidosis. Pt was seen by cardiology and will followup as outpatient for cardiac MRI with stress.     Lace+ Score: 34  59-90 High Risk  29-58 Medium Risk  0-28   Low Risk       TCM Follow-Up Recommendation:  LACE 29-58: Moderate Risk of readmission after discharge from the hospital.      Procedures during hospitalization:   none    Incidental or significant findings and recommendations (brief descriptions):  none    Lab/Test results pending at Discharge:   none    Consultants:  Cardiology    Discharge Medication List:     Discharge Medications        CHANGE how you take these  medications        Instructions Prescription details   amLODIPine 5 MG Tabs  Commonly known as: Norvasc  Start taking on: February 10, 2024  What changed:   medication strength  See the new instructions.      Take 1 tablet (5 mg total) by mouth daily.   Quantity: 30 tablet  Refills: 0     ergocalciferol 1.25 MG (92538 UT) Caps  Commonly known as: Vitamin D2  What changed:   how much to take  how to take this  when to take this  additional instructions      TAKE ONE WEEKLY FOR 12 WEEKS.   Quantity: 12 capsule  Refills: 0            CONTINUE taking these medications        Instructions Prescription details   cabergoline 0.5 MG Tabs  Commonly known as: DOSTINEX      Take 0.5 tablets (0.25 mg total) by mouth once a week.   Refills: 0     hydroxychloroquine 200 MG Tabs  Commonly known as: Plaquenil      Take 1 tablet twice daily every other day alternating with 1 tablet once daily every other day   Quantity: 135 tablet  Refills: 1     Womens Multivitamin Plus Tabs      Take 1 tablet by mouth daily.   Refills: 0               Where to Get Your Medications        These medications were sent to [facility]/pharmacy #[id number] - [place], [place] - [address] [address] [phone], [phone]  [address] [address]      Phone: [phone]   amLODIPine 5 MG Tabs         [place]    Follow-up appointment:   No follow-up provider specified.  Appointments for Next 30 Days 2/9/2024 - 3/10/2024        Date Arrival Time Visit Type Length Department Provider     3/1/2024  8:00 AM  [facility] CT CHEST WO [[id number]] 30 min [facility] CT  CT MAIN RM1    Patient Instructions:     Please arrive 15 minutes prior to your scheduled appointment time.      Location Instructions:     Your appointment will be at [facility] located at [address] [place] [id number].&nbsp; To reach Outpatient Registration, turn onto [address] from [facility].&nbsp; You may park or  in the South Parking Garage.&nbsp;  Enter the double doors located on the ground floor and proceed to the lobby past the Information Desk to Outpatient Registration.&nbsp; The phone number for this location is [phone].  If you suspect you may be pregnant please consult with your physician prior to your exam.&nbsp; If you have a continuous glucose monitor you will be asked to remove it during the exam.  Please bring your insurance card and photo ID. You will also need to bring your doctor's order unless your physician's office submitted the order electronically or faxed the order. Without the order, your test may be delayed or postponed.&nbsp; Certain health screening tests may not require an order.  Because of the nature of the Emergency Department, please be advised of the possibility that your appointment may be delayed.  Children: Children under the age of 12 must have another adult caregiver with them.&nbsp; Please do not bring your child/children without a caregiver.&nbsp; Because of the highly sensitive equipment and privacy of all our patients, children will not be permitted in the exam rooms, unless otherwise noted and in accordance with departmental policy.  PATIENT RESPONSIBILITY ESTIMATE  - (Estimate) We will provide you with your estimated remaining deductible and coinsurance balance for your services at the time of check in.  - (Payment) Please be aware that you may be asked for payment at the time of service.  - (Questions) If you would like more information about your Patient Responsibility Estimate in advance of your appointment, you can speak to a  ([phone], option 5).  Masks are optional for all patients and visitors, unless otherwise indicated.                      Vital signs:  Temp:  [97.4 °F (36.3 °C)-98.4 °F (36.9 °C)] 97.4 °F (36.3 °C)  Pulse:  [68-98] 74  Resp:  [10-18] 18  BP: (122-150)/(76-97) 122/81  SpO2:  [97 %-100 %] 97 %    Physical Exam:    General: No acute distress   Lungs: clear to  auscultation  Cardiovascular: S1, S2  Abdomen: Soft      -----------------------------------------------------------------------------------------------  PATIENT DISCHARGE INSTRUCTIONS: See electronic chart    Yogesh Stovall MD    Total time spent on discharge plannin minutes     The  [employer] Cures Act makes medical notes like these available to patients in the interest of transparency. Please be advised this is a medical document. Medical documents are intended to carry relevant information, facts as evident, and the clinical opinion of the practitioner. The medical note is intended as peer to peer communication and may appear blunt or direct. It is written in medical language and may contain abbreviations or verbiage that are unfamiliar.

## 2024-02-09 NOTE — PROGRESS NOTES
Psych Liaison placed referrals for psychotherapy/psychiatry in discharge summary in-network with pt's insurance in discharge summary.

## 2024-02-09 NOTE — PLAN OF CARE
NURSING DISCHARGE NOTE    Discharged Home via Ambulatory.  Accompanied by RN  Belongings Taken by patient/family.  Tele and IV d/c'd. Discharge instructions, meds, follow up appt explained to patient with understanding verbalized.
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
## 2024-02-09 NOTE — DISCHARGE INSTRUCTIONS
Please call [facility] central scheduling [phone] to schedule cardiac MRI    Individual Psychotherapy/Psychiatry In-Network with BCBS Private Pay    [facility]    [address] [place] [address]  Phone: [phone]  [email or web address]  Accepts: Medicaid, self pay, private insurance  Hours of operation: 24 hours a day/7 days a week   Services include:   Mental health services   Inpatient   Outpatient   Substance abuse   Other treatment programs   Eating disorders  Gambling   Depression/Anxiety  Obsessive compulsive disorders       [facility]  [address], [place] -or- [address], [address], [place], [place] -or- [address], [address], [place], [place]  [phone]    [facility]  [address], [address], [place], [place]  [phone]    [email or web address]  [address], [address], [place], [place]  [phone]    [facility]   [address] [facility] [address] [place], [place] and [address] [facility] [address] [place], [place] [address] [phone]    [facility]  [address], [address] [place], [place] [id number] [phone]    [facility]  2 locations: [address] [facility] [address], [place], [place]  - or - [address], [place], [place]   [phone]    [facility]  [address] [facility], [place], [place]  [phone]    [facility]  [address], [address], [place], [place]  [phone]
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
## 2024-02-12 ENCOUNTER — PATIENT OUTREACH (OUTPATIENT)
Dept: CASE MANAGEMENT | Age: 54
End: 2024-02-12

## 2024-02-12 PROCEDURE — 1111F DSCHRG MED/CURRENT MED MERGE: CPT

## 2024-02-12 RX ORDER — FLUTICASONE PROPIONATE 50 MCG
1 SPRAY, SUSPENSION (ML) NASAL DAILY
COMMUNITY

## 2024-02-12 RX ORDER — MONTELUKAST SODIUM 10 MG/1
10 TABLET ORAL NIGHTLY
COMMUNITY
Start: 2024-02-12

## 2024-02-12 NOTE — PROGRESS NOTES
Initial Post Discharge Follow Up   Discharge Date: 2/9/24  Contact Date: 2/12/2024    Consent Verification:  Assessment Completed With: Patient  HIPAA Verified?  Yes    Discharge Dx:   Sarcoidosis  Elevated Troponin    General:   How have you been since your discharge from the hospital? The patient reported doing well at home. The patient's bilateral hand/foot tingling and dizziness/balance difficulty has completely subsided. The patient denies any chest pain, SOB/trouble breathing, wheezing, palpitations, irregular heart beats or stroke like symptoms.  Do you have any pain since discharge?  No    How well was your pain managed while in the hospital?   On a scale of 1-5   1- Very Poor and 5- Very well   Very Well  When you were leaving the hospital were your discharge instructions reviewed with you? Yes  How well were your discharge instructions explained to you?   On a scale of 1-5   1- Very Poor and 5- Very well   Very Well; The patient reported MRI scheduling was not completed over the weekend. The hospital would need a new note for a stress test. The patient was informed [facility] does not complete a cardiac MRI with and without contrast. NCM will follow up with [facility].    Please call [facility] central scheduling [phone] to schedule cardiac MRI  Do you have any questions about your discharge instructions?  No  Before leaving the hospital was your diagnoses explained to you? Yes  Do you have any questions about your diagnoses? No  Are you able to perform normal daily activities of living as you have prior to your hospital stay (dressing, bathing, ambulating to the bathroom, etc)? yes; NCM did review/stress the importance of fall precautions.   (NCM) Was patient given a different diet per AVS? no      Medications:   Current Outpatient Medications   Medication Sig Dispense Refill    amLODIPine 5 MG Oral Tab Take 1 tablet (5 mg total) by mouth daily. 30 tablet 0    hydroxychloroquine (PLAQUENIL) 200 MG Oral Tab Take  1 tablet twice daily every other day alternating with 1 tablet once daily every other day 135 tablet 1    cabergoline 0.5 MG Oral Tab Take 0.5 tablets (0.25 mg total) by mouth once a week.      ERGOCALCIFEROL 1.25 MG (39243 UT) Oral Cap TAKE ONE WEEKLY FOR 12 WEEKS. (Patient taking differently: Take 1 capsule (50,000 Units total) by mouth As Directed. Every 14 days.) 12 capsule 0    Multiple Vitamins-Minerals (WOMENS MULTIVITAMIN PLUS) Oral Tab Take 1 tablet by mouth daily.       Were there any changes to your current medication(s) noted on the AVS? Yes  If so, were these medication changes discussed with you prior to leaving the hospital? Yes  Let's go over your medications together to make sure we are not missing anything. Medications Reviewed  Are there any reasons that keep you from taking your medication as prescribed? Yes  The patient has not yet started this Rx:   Medication Quantity Refills Start End   hydroxychloroquine (PLAQUENIL) 200 MG Oral Tab 135 tablet 1 1/29/2024 --   Sig:   Take 1 tablet twice daily every other day alternating with 1 tablet once daily every other day       The patient would prefer to hold the Rx until being seen by a sarcoidosis specialist.     The patient has been taking Flonase and will be starting Singulair per ENT recommendations.     The patient reported holding the following Rx due to hypotensive home readings:    Medication Quantity Refills Start End   amLODIPine 5 MG Oral Tab 30 tablet 0 2/10/2024 --   Sig:   Take 1 tablet (5 mg total) by mouth daily.       The patient has not checked BP today but reported a reading of 105/68 on 2/10/2024.     Did patient receive their flu shot (Sept-March)? No    Discharge medications reviewed/discussed/and reconciled against outpatient medications with patient.  Any changes or updates to medications sent to PCP.  Patient Acknowledged     Referrals/orders at D/C:  Referrals/orders placed at D/C? no    Except for Home Health Services  mentioned above, have you scheduled these other services? Yes   The patient is scheduled for a CT of the chest on 3/1/2024.   If yes, have you started these services? no    The patient was discharged with the following instructions:   Please call [facility] central scheduling [phone] to schedule cardiac MRI    If no, do you need help with this? Yes; NCM contacted [facility] to provided further assistance.     The patient reported MRI scheduling was not completed over the weekend. The hospital would need a new note for a stress test. The patient was informed [facility] does not complete a cardiac MRI with and without contrast.    DME ordered at D/C? No      Discharge orders, AVS reviewed and discussed with patient. Any changes or updates to orders sent to PCP.  Patient Acknowledged      SDOH:   Transportation:   Transportation Needs: No Transportation Needs (2/12/2024)    Transportation Needs     Lack of Transportation: No       Financial Strain:   Financial Resource Strain: Low Risk  (2/12/2024)    Financial Resource Strain     Difficulty of Paying Living Expenses: Not hard at all     Med Affordability: No     Follow up appointments:      Your appointments       Date & Time Appointment Department (Center)    Mar 01, 2024  8:00 AM CST CT CHEST with  CT MAIN 1 [facility] CT ([facility])    Please arrive 15 minutes prior to your scheduled appointment time.      Jan 30, 2025  4:00 PM CST Exam - Established with Arnaldo Ba MD [facility] ([facility])              [facility] CT  [facility]  [address] [address]  [phone] [facility] [place]  [address] [facility] [id number] [phone]            TCC  Was TCC ordered: No      PCP (If no TCC appointment)  Does patient already have a PCP appointment  scheduled? No  NCM Scheduled PCP office TCM appointment with patient on 2/16/2024 with Dr. Riley.       Specialist    Does the patient have any other follow up appointment(s) needing to be scheduled? Yes    NCM contacted [facility] to assist patient with recommended outpatient tests and appointments.    Call Michele Lacey  Specialty: Cardiovascular Diseases  [facility]-[address] [address]  [phone]    Is there any reason as to why you cannot make your appointment(s)?  No     Needs post D/C:   Now that you are home, are there any needs or concerns you need addressed before your next visit with your PCP?  (DME, meds, questions, etc.): Yes  The patient was discharged with the following instructions:   Please call [facility] central scheduling [phone] to schedule cardiac MRI    If no, do you need help with this? Yes; NCM contacted [facility] to provided further assistance.     The patient reported MRI scheduling was not completed over the weekend. The hospital would need a new note for a stress test. The patient was informed [facility] does not complete a cardiac MRI with and without contrast.      NCM left a detailed message with [facility]. The patient and NCM contact information was provided for the office. Encouraged the patient to follow up with the NCM with any further questions or concerns.      Interventions by NCM:    Reviewed all discharge instructions with the patient with a focus on Dx sarcoidosis and fall precautions. All medications were reviewed and educated on the importance of taking the medications as directed and the Rx list was updated.  Patient symptoms were assessed, education was completed for signs/symptoms to monitor, and reviewed when to contact providers vs go to the ED/call 911. Appointments were reviewed and stressed the importance of a close follow up with providers. A TCM-HFU appointment was scheduled. [facility] was contacted for assistance with follow up outpatient testing  and appointments. The patient verbalized understanding and will contact the office with any further questions or concerns.     Overall Rating:   How would you rate the care you received while in the hospital? good    CCM referral placed:    No    BOOK BY DATE: 2/23/2024

## 2024-02-14 ENCOUNTER — OFFICE VISIT (OUTPATIENT)
Dept: RHEUMATOLOGY | Facility: CLINIC | Age: 54
End: 2024-02-14
Payer: COMMERCIAL

## 2024-02-14 VITALS
OXYGEN SATURATION: 98 % | WEIGHT: 141.38 LBS | DIASTOLIC BLOOD PRESSURE: 74 MMHG | SYSTOLIC BLOOD PRESSURE: 122 MMHG | BODY MASS INDEX: 22.19 KG/M2 | HEIGHT: 67 IN | RESPIRATION RATE: 16 BRPM | TEMPERATURE: 98 F | HEART RATE: 95 BPM

## 2024-02-14 DIAGNOSIS — D35.2 PITUITARY ADENOMA (HCC): ICD-10-CM

## 2024-02-14 DIAGNOSIS — F43.0 ACUTE STRESS REACTION: ICD-10-CM

## 2024-02-14 DIAGNOSIS — G62.9 NEUROPATHY: ICD-10-CM

## 2024-02-14 DIAGNOSIS — Z86.2 HISTORY OF SARCOIDOSIS: Primary | ICD-10-CM

## 2024-02-14 DIAGNOSIS — Z11.59 NEED FOR HEPATITIS C SCREENING TEST: ICD-10-CM

## 2024-02-14 DIAGNOSIS — L92.9 NON-CASEATING GRANULOMA: Primary | ICD-10-CM

## 2024-02-14 DIAGNOSIS — Z11.1 SCREENING FOR TUBERCULOSIS: ICD-10-CM

## 2024-02-14 DIAGNOSIS — M25.50 ARTHRALGIA, UNSPECIFIED JOINT: ICD-10-CM

## 2024-02-14 DIAGNOSIS — Z11.59 NEED FOR HEPATITIS B SCREENING TEST: ICD-10-CM

## 2024-02-14 PROCEDURE — 3008F BODY MASS INDEX DOCD: CPT | Performed by: INTERNAL MEDICINE

## 2024-02-14 PROCEDURE — 3074F SYST BP LT 130 MM HG: CPT | Performed by: INTERNAL MEDICINE

## 2024-02-14 PROCEDURE — 3078F DIAST BP <80 MM HG: CPT | Performed by: INTERNAL MEDICINE

## 2024-02-14 PROCEDURE — 99245 OFF/OP CONSLTJ NEW/EST HI 55: CPT | Performed by: INTERNAL MEDICINE

## 2024-02-14 RX ORDER — CLONAZEPAM 0.5 MG/1
TABLET ORAL 2 TIMES DAILY PRN
Qty: 14 TABLET | Refills: 0 | Status: SHIPPED | OUTPATIENT
Start: 2024-02-14 | End: 2024-02-21
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
## 2024-02-14 NOTE — PATIENT INSTRUCTIONS
Get cardiac MRI     Get blood work.    I think your PIP (middle knuckle pain) could be due to your hypermobility    If your neuropathy is constant, then let me know, could order a nerve study    Message Dr. John about the possible new diagnosis of sarcoidosis and your pituitary adenoma.

## 2024-02-16 ENCOUNTER — LAB ENCOUNTER (OUTPATIENT)
Dept: LAB | Age: 54
End: 2024-02-16
Attending: INTERNAL MEDICINE
Payer: COMMERCIAL

## 2024-02-16 DIAGNOSIS — Z11.1 SCREENING FOR TUBERCULOSIS: ICD-10-CM

## 2024-02-16 DIAGNOSIS — M25.50 ARTHRALGIA, UNSPECIFIED JOINT: ICD-10-CM

## 2024-02-16 DIAGNOSIS — Z11.59 NEED FOR HEPATITIS C SCREENING TEST: ICD-10-CM

## 2024-02-16 DIAGNOSIS — L92.9 NON-CASEATING GRANULOMA: ICD-10-CM

## 2024-02-16 DIAGNOSIS — G62.9 NEUROPATHY: ICD-10-CM

## 2024-02-16 DIAGNOSIS — Z11.59 NEED FOR HEPATITIS B SCREENING TEST: ICD-10-CM

## 2024-02-16 LAB
CRYPTOCOCCAL ANTIGEN, SERUM: NEGATIVE
DEPRECATED HBV CORE AB SER IA-ACNC: 93.8 NG/ML
FOLATE SERPL-MCNC: 15.8 NG/ML (ref 5.4–?)
HBV CORE AB SERPL QL IA: NONREACTIVE
HBV SURFACE AB SER QL: NONREACTIVE
HBV SURFACE AB SERPL IA-ACNC: 3.74 MIU/ML
HBV SURFACE AG SER-ACNC: 0.16 [IU]/L
HBV SURFACE AG SERPL QL IA: NONREACTIVE
HCV AB SERPL QL IA: NONREACTIVE
IRON SATN MFR SERPL: 31 %
IRON SERPL-MCNC: 83 UG/DL
TIBC SERPL-MCNC: 270 UG/DL (ref 250–425)
TRANSFERRIN SERPL-MCNC: 181 MG/DL (ref 250–380)
TSI SER-ACNC: 1.26 MIU/ML (ref 0.55–4.78)
VIT B12 SERPL-MCNC: 657 PG/ML (ref 211–911)
VIT D+METAB SERPL-MCNC: 63.7 NG/ML (ref 30–100)

## 2024-02-16 PROCEDURE — 84443 ASSAY THYROID STIM HORMONE: CPT

## 2024-02-16 PROCEDURE — 82746 ASSAY OF FOLIC ACID SERUM: CPT

## 2024-02-16 PROCEDURE — 86480 TB TEST CELL IMMUN MEASURE: CPT | Performed by: INTERNAL MEDICINE

## 2024-02-16 PROCEDURE — 86704 HEP B CORE ANTIBODY TOTAL: CPT | Performed by: INTERNAL MEDICINE

## 2024-02-16 PROCEDURE — 86698 HISTOPLASMA ANTIBODY: CPT

## 2024-02-16 PROCEDURE — 82607 VITAMIN B-12: CPT

## 2024-02-16 PROCEDURE — 86612 BLASTOMYCES ANTIBODY: CPT

## 2024-02-16 PROCEDURE — 82728 ASSAY OF FERRITIN: CPT

## 2024-02-16 PROCEDURE — 36415 COLL VENOUS BLD VENIPUNCTURE: CPT

## 2024-02-16 PROCEDURE — 82652 VIT D 1 25-DIHYDROXY: CPT

## 2024-02-16 PROCEDURE — 86635 COCCIDIOIDES ANTIBODY: CPT

## 2024-02-16 PROCEDURE — 86706 HEP B SURFACE ANTIBODY: CPT | Performed by: INTERNAL MEDICINE

## 2024-02-16 PROCEDURE — 87340 HEPATITIS B SURFACE AG IA: CPT | Performed by: INTERNAL MEDICINE

## 2024-02-16 PROCEDURE — 82306 VITAMIN D 25 HYDROXY: CPT

## 2024-02-16 PROCEDURE — 83540 ASSAY OF IRON: CPT

## 2024-02-16 PROCEDURE — 86334 IMMUNOFIX E-PHORESIS SERUM: CPT

## 2024-02-16 PROCEDURE — 87385 HISTOPLASMA CAPSUL AG IA: CPT

## 2024-02-16 PROCEDURE — 84466 ASSAY OF TRANSFERRIN: CPT

## 2024-02-16 PROCEDURE — 86803 HEPATITIS C AB TEST: CPT | Performed by: INTERNAL MEDICINE

## 2024-02-16 PROCEDURE — 87899 AGENT NOS ASSAY W/OPTIC: CPT

## 2024-02-16 PROCEDURE — 83521 IG LIGHT CHAINS FREE EACH: CPT

## 2024-02-16 PROCEDURE — 84165 PROTEIN E-PHORESIS SERUM: CPT
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
## 2024-02-19 LAB
M TB IFN-G CD4+ T-CELLS BLD-ACNC: 0.01 IU/ML
M TB TUBERC IFN-G BLD QL: NEGATIVE
M TB TUBERC IGNF/MITOGEN IGNF CONTROL: >10 IU/ML
QFT TB1 AG MINUS NIL: 0.01 IU/ML
QFT TB2 AG MINUS NIL: -0.01 IU/ML
VIT D 1,25 DI-OH: 93 PG/ML

## 2024-02-20 LAB
ALBUMIN SERPL ELPH-MCNC: 3.97 G/DL (ref 3.75–5.21)
ALBUMIN/GLOB SERPL: 1.31 {RATIO} (ref 1–2)
ALPHA1 GLOB SERPL ELPH-MCNC: 0.2 G/DL (ref 0.19–0.46)
ALPHA2 GLOB SERPL ELPH-MCNC: 0.57 G/DL (ref 0.48–1.05)
B-GLOBULIN SERPL ELPH-MCNC: 0.71 G/DL (ref 0.68–1.23)
GAMMA GLOB SERPL ELPH-MCNC: 1.55 G/DL (ref 0.62–1.7)
HISTOPLASMA ABS, QN, DID: NEGATIVE
KAPPA LC FREE SER-MCNC: 2 MG/DL (ref 0.33–1.94)
KAPPA LC FREE/LAMBDA FREE SER NEPH: 1.88 {RATIO} (ref 0.26–1.65)
LAMBDA LC FREE SERPL-MCNC: 1.06 MG/DL (ref 0.57–2.63)
PROT SERPL-MCNC: 7 G/DL (ref 5.7–8.2)

## 2024-02-21 LAB
BLASTOMYCES DERMATITIDIS CF AB: NEGATIVE
BLASTOMYCES DERMATITIDIS ID AB: NEGATIVE

## 2024-02-23 LAB
COCCIDIOIDES AB (ID): NOT DETECTED
COCCIDIOIDES AB, IGG: 1.2 IV
COCCIDIOIDES AB, IGM: 0.2 IV

## 2024-02-26 LAB — UR GALACT AG SCR: NEGATIVE

## 2024-02-27 ENCOUNTER — TELEPHONE (OUTPATIENT)
Dept: RHEUMATOLOGY | Facility: CLINIC | Age: 54
End: 2024-02-27

## 2024-02-27 DIAGNOSIS — B37.9 YEAST CELLS AND FUNGAL ELEMENTS PRESENT ON DIAGNOSTIC TESTING: ICD-10-CM

## 2024-02-27 DIAGNOSIS — B49 YEAST CELLS AND FUNGAL ELEMENTS PRESENT ON DIAGNOSTIC TESTING: ICD-10-CM

## 2024-02-27 DIAGNOSIS — L92.9 NON-CASEATING GRANULOMA: Primary | ICD-10-CM

## 2024-03-01 ENCOUNTER — HOSPITAL ENCOUNTER (OUTPATIENT)
Dept: CT IMAGING | Facility: HOSPITAL | Age: 54
Discharge: HOME | End: 2024-03-01
Attending: INTERNAL MEDICINE
Payer: COMMERCIAL

## 2024-03-01 ENCOUNTER — PATIENT MESSAGE (OUTPATIENT)
Dept: INTERNAL MEDICINE CLINIC | Facility: CLINIC | Age: 54
End: 2024-03-01

## 2024-03-01 DIAGNOSIS — R91.8 LUNG NODULES: ICD-10-CM

## 2024-03-01 DIAGNOSIS — R59.0 MEDIASTINAL LYMPHADENOPATHY: ICD-10-CM

## 2024-03-01 PROCEDURE — 71250 CT THORAX DX C-: CPT | Performed by: INTERNAL MEDICINE

## 2024-03-01 NOTE — TELEPHONE ENCOUNTER
From: Purvi Sidhu  To: Brenda Riley  Sent: 3/1/2024 9:12 AM CST  Subject: Heart MRI    Stone Riley,    FYI - I did not hear back from anyone at [facility] regarding scheduling the Heart MRI a bit sooner. I am going to will reach out to the Cardiologist office to get more information regarding this test and the stress test they ordered.    FYI - I had my 3-month CT scan today.    Thank you,    Purvi Sidhu

## 2024-03-07 ENCOUNTER — TELEPHONE (OUTPATIENT)
Dept: INTERNAL MEDICINE CLINIC | Facility: CLINIC | Age: 54
End: 2024-03-07

## 2024-03-07 NOTE — TELEPHONE ENCOUNTER
[facility] is calling with a possible drug interaction between   Lexapro and Hydroxychloroquine    Please clarify if okay for patient to continue Lexapro  Phone [phone]   (4) rarely moist

## 2024-03-08 NOTE — TELEPHONE ENCOUNTER
To  - may consider changing the antidepressant as pt is on hydroxychlorquine 200 mg daily from  which has potential to QT interval prolongation    Leonarda and colleagues2 emphasize that SSRIs, including fluoxetine and paroxetine, are not statistically significantly associated with QT prolongation, as indicated by comprehensive, prospective studies regarding the corrected QT (QTc) interval. More caution should be used with escitalopram than with other SSRIs

## 2024-03-19 ENCOUNTER — LAB ENCOUNTER (OUTPATIENT)
Dept: LAB | Facility: HOSPITAL | Age: 54
End: 2024-03-19
Attending: INTERNAL MEDICINE
Payer: COMMERCIAL

## 2024-03-19 DIAGNOSIS — B49 YEAST CELLS AND FUNGAL ELEMENTS PRESENT ON DIAGNOSTIC TESTING: ICD-10-CM

## 2024-03-19 DIAGNOSIS — L92.9 NON-CASEATING GRANULOMA: ICD-10-CM

## 2024-03-19 DIAGNOSIS — B37.9 YEAST CELLS AND FUNGAL ELEMENTS PRESENT ON DIAGNOSTIC TESTING: ICD-10-CM

## 2024-03-19 PROCEDURE — 36415 COLL VENOUS BLD VENIPUNCTURE: CPT

## 2024-03-19 PROCEDURE — 86635 COCCIDIOIDES ANTIBODY: CPT

## 2024-03-20 ENCOUNTER — OFFICE VISIT (OUTPATIENT)
Dept: RHEUMATOLOGY | Facility: CLINIC | Age: 54
End: 2024-03-20
Payer: COMMERCIAL

## 2024-03-20 VITALS
HEART RATE: 82 BPM | RESPIRATION RATE: 16 BRPM | DIASTOLIC BLOOD PRESSURE: 76 MMHG | TEMPERATURE: 98 F | WEIGHT: 140.81 LBS | OXYGEN SATURATION: 98 % | SYSTOLIC BLOOD PRESSURE: 120 MMHG | BODY MASS INDEX: 22.1 KG/M2 | HEIGHT: 67 IN

## 2024-03-20 DIAGNOSIS — D86.9 SARCOIDOSIS: Primary | ICD-10-CM

## 2024-03-20 DIAGNOSIS — M25.50 ARTHRALGIA, UNSPECIFIED JOINT: ICD-10-CM

## 2024-03-20 DIAGNOSIS — B37.9 YEAST CELLS AND FUNGAL ELEMENTS PRESENT ON DIAGNOSTIC TESTING: ICD-10-CM

## 2024-03-20 DIAGNOSIS — E67.3 HIGH VITAMIN D LEVEL: ICD-10-CM

## 2024-03-20 DIAGNOSIS — B49 YEAST CELLS AND FUNGAL ELEMENTS PRESENT ON DIAGNOSTIC TESTING: ICD-10-CM

## 2024-03-20 DIAGNOSIS — H47.233 OPTIC CUPPING OF BOTH EYES: ICD-10-CM

## 2024-03-20 DIAGNOSIS — L92.9 NON-CASEATING GRANULOMA: ICD-10-CM

## 2024-03-20 DIAGNOSIS — D35.2 PITUITARY ADENOMA (HCC): ICD-10-CM

## 2024-03-20 DIAGNOSIS — E67.3 HYPERVITAMINOSIS D: ICD-10-CM

## 2024-03-20 DIAGNOSIS — G62.9 NEUROPATHY: ICD-10-CM

## 2024-03-20 PROCEDURE — 3008F BODY MASS INDEX DOCD: CPT | Performed by: INTERNAL MEDICINE

## 2024-03-20 PROCEDURE — 3078F DIAST BP <80 MM HG: CPT | Performed by: INTERNAL MEDICINE

## 2024-03-20 PROCEDURE — 3074F SYST BP LT 130 MM HG: CPT | Performed by: INTERNAL MEDICINE

## 2024-03-20 PROCEDURE — 99215 OFFICE O/P EST HI 40 MIN: CPT | Performed by: INTERNAL MEDICINE

## 2024-03-20 NOTE — PROGRESS NOTES
Rheumatology f/u Patient Note  =====================================================================================================    Chief complaint: Noncaseating granulomas  Chief Complaint   Patient presents with    Follow - Up     LOV 2/14/2024. Pt says she has been very stressed lately due to her parents illness and her own health issues as well. She feels about the same and still has the bilateral hand stiffness. She recently had a stress test a that showed some abnormality. She states she was referred to cardiology. She is trying to manage her stress on her own without medication. Rapid 3 score 1.0.     PCP  Brenda Riley DO  Fax: [phone]  Phone: [phone]    =====================================================================================================  HPI    Date of visit: 2/14/2024    Purvi Sidhu is a 54 year old female     Here for evaluation of sarcoidosis.  -3-4 years back, developed significant hand stiffness/pain. -previously doublejointed, poping joints frequently, stopped this in 2021.   -Nov 2023: Chest X-ray completed. Then CT Chest completed with scattered pulmonary nodules and mediastinal lymphadenopathy, s/p EBUS with TBBx demonstrated non-caseating granulomas  -saw ophtho: no ocular sarcoid is noted.  Recently diagnosed with optic neuropathy due to suspected glaucoma.  -had tingling in fingers/toes, went to ER. Significant anxiety is noted.  No numbness and tingling currently.  -troponin was elevated. Saw cardiology in hospital, recommended MRI cardiac stress wwo.  To workup cardiac sarcoid.  -walking 2 miles per day.   -lost 10 lbs unintentionally.  Lots of stress  -travel internationally and domestically. Including [place], [place].  Has been to [place], [place] in the past.  -working as  specialist.   -Hx of hyperparathyroidism, s/p resection.  -Pituitary adenoma history, follows with endocrinology.  Fhx:  2 1st cousins with SLE    Stepfather with  pancreatic cancer  Mother with lung cancer    ==============================================================================================================  Today's Visit: 03/20/24    Seeing Dr. Navarro (sarcoid specialist) in May 2024.  Recently saw pulmonary.  Repeated CT of the chest, pulmonary nodules are stable.  -getting cardiac MRI wwo contrast.  Will be in April.  -Anxious, mother with lung cancer, stepfather with pancreatic cancer.  -tingling in the hands are intermittent.   -Stopped her vitamin D supplement given elevated 1, 25-dihydroxy vitamin D  -Recent nuclear stress test was normal.  There is      14 point ROS negative except noted above    Medications:  Current Outpatient Medications on File Prior to Visit   Medication Sig Dispense Refill    montelukast 10 MG Oral Tab Take 1 tablet (10 mg total) by mouth nightly.      fluticasone propionate 50 MCG/ACT Nasal Suspension 1 spray by Each Nare route daily.      cabergoline 0.5 MG Oral Tab Take 0.5 tablets (0.25 mg total) by mouth once a week.      ERGOCALCIFEROL 1.25 MG (85416 UT) Oral Cap TAKE ONE WEEKLY FOR 12 WEEKS. (Patient taking differently: Take 1 capsule (50,000 Units total) by mouth As Directed. Every 14 days.) 12 capsule 0    Multiple Vitamins-Minerals (WOMENS MULTIVITAMIN PLUS) Oral Tab Take 1 tablet by mouth daily.      escitalopram (LEXAPRO) 5 MG Oral Tab Take 1 tablet (5 mg total) by mouth daily. (Patient not taking: Reported on 3/20/2024) 90 tablet 1     No current facility-administered medications on file prior to visit.   Allergies:  Allergies   Allergen Reactions    Amoxicillin HIVES    Penicillins HIVES         Objective    Vitals:    03/20/24 0932   BP: 120/76   Pulse: 82   Resp: 16   Temp: 97.9 °F (36.6 °C)   SpO2: 98%   Weight: 140 lb 12.8 oz (63.9 kg)   Height: 5' 7\" (1.702 m)     GEN: NAD, well-nourished.   HEENT: Head: NCAT. Face: No lesions. Eyes: Conjunctiva clear.   PULM:  easy effort  Extremities: No cyanosis, edema or  deformities.   Neurologic: Strength, CN2-12 grossly intact   Skin: No lesions or rashes.  MSK: 28 joint count performed. No evidence of synovitis in mcp, pip, dip, wrist, elbows, shoulders, hips, knees, ankles, mtp unless otherwise noted. Full ROM of elbows, wrists, knees.     Negative Huff, negative Jill's.    Labs:    2/2024  Urine histo negative Quant gold negative  SPEP WNL  Serum crypto negative  Cocci CF negative, cocci IgG borderline  Cocci IgG DVTP negative  Serum histo antibody negative  Blasto negative  Vitamin D WNL  1, 25 dihydroxy vitamin D 93.0, upper limit of normal 81.5  B12, folate negative  Ferritin WNL  TSH WNL  Vitamin D63.7  Hepatitis B surface antibody negative  HCV neg    1/2024  TREE, RF negative  ACE neg  MPO/PR3    1/2024 TBBx Mediastinal lymph node   Non-caseating granulomas.  No organisms seen with AFB and GMS stains.  No malignant cells seen.      Lab Results   Component Value Date    ESRML 6 02/09/2024    ESRML 16 01/18/2024    ESRML 14 01/07/2021    CRP <0.29 02/09/2024    CRP <0.29 01/07/2021        Lab Results   Component Value Date    WBC 4.7 02/08/2024    RBC 4.15 02/08/2024    HGB 13.2 02/08/2024    HCT 39.2 02/08/2024    .0 (L) 02/08/2024    MPV 9.1 05/26/2018    MCV 94.5 02/08/2024    MCH 31.8 02/08/2024    MCHC 33.7 02/08/2024    RDW 12.7 02/08/2024    NEPRELIM 2.48 02/08/2024    NEUT 4.6 05/12/2016    LYMPH 1.9 05/12/2016    MON 0.4 05/12/2016    EOS 0.4 05/12/2016    BASO 0.1 05/12/2016    NEPERCENT 52.7 02/08/2024    LYPERCENT 41.6 02/08/2024    MOPERCENT 4.5 02/08/2024    EOPERCENT 0.8 02/08/2024    BAPERCENT 0.4 02/08/2024    NE 2.48 02/08/2024    LYMABS 1.96 02/08/2024    MOABSO 0.21 02/08/2024    EOABSO 0.04 02/08/2024    BAABSO 0.02 02/08/2024     Lab Results   Component Value Date    GLU 87 02/08/2024    BUN 10 02/08/2024    BUNCREA 11.6 01/24/2024    CREATSERUM 0.88 02/08/2024    ANIONGAP 5 02/08/2024    GFRNAA 75 02/25/2021    GFRAA 86 02/25/2021    CA  10.1 02/08/2024    OSMOCALC 290 02/08/2024    ALKPHO 77 02/08/2024    AST 24 02/08/2024    ALT 22 02/08/2024    ALKPHOS 67 04/21/2016    BILT 0.7 02/08/2024    TP 7.0 02/16/2024    ALB 3.97 02/16/2024    GLOBULIN 3.9 02/08/2024    AGRATIO 1.1 04/21/2016     02/08/2024    K 4.4 02/09/2024     02/08/2024    CO2 26.0 02/08/2024         Lab Results   Component Value Date    ANASCRN Positive (A) 01/07/2021     No results found for: \"SSA\", \"SSAUR\", \"ANTISSA\", \"SSA52\", \"SSA60\", \"SSADD\", \"SSB\", \"ANTISSB\"  Lab Results   Component Value Date    DSDNA <10 04/05/2021    SMITHRNP Negative 04/05/2021     No results found for: \"SCL70\", \"SCL\", \"GCDFVKI75\"  Lab Results   Component Value Date    C3 85.8 (L) 02/09/2021    C4 27.8 02/09/2021     No results found for: \"DRVVT\", \"LAINT\", \"PTTLUPUS\", \"LUPUSINTERP\", \"LA\", \"A3HD7IXQHP\", \"W7SH2JOBNY\", \"D8BTFAKPBU\", \"U1EDCHWACW\"  No results found for: \"CARDIOLIPIGG\", \"CARDIOLIPIGM\", \"CARDIOLIPIGA\", \"CARDIOIGA\", \"CARLIP\"      Additional Labs:    Radiology:  CT CHEST (CPT=71250)    Result Date: 3/1/2024  CONCLUSION:  1. Stable scattered subcentimeter sized pulmonary nodules bilaterally.  No significant interval changes.  Follow-up per Fleischner criteria.  2. Overall, no significant interval changes from the previous CT scan of the chest are noted.  3. The findings include multiple, incidentally detected, solid pulmonary nodules, measuring between 6 and 8mm.  2017 guidelines from the Fleischner Society for the follow-up and management of newly detected indeterminate pulmonary nodules in persons at least 35 years of age depend on nodule size (average length and width) and underlying risk factors (including smoking and other risk factors). Please consider the following recommendations after clinical assessment of risk factors.  For 6-8mm nodules: In  low risk patients, CT in 3 to 6 months, then consider CT in 18 to 24 months.  In high risk patients, CT in 3 to 6 months, then obtain CT  in 18 to 24 months.  Use most suspicious nodule as guide to management.   LOCATION:  [facility]   Dictated by (CST): Roberto Delgadillo DO on 3/01/2024 at 8:57 AM     Finalized by (CST): Roberto Delgadillo DO on 3/01/2024 at 9:05 AM       CT BRAIN OR HEAD (65743)    Result Date: 2/8/2024  CONCLUSION:  No acute intracranial process.    LOCATION:  [facility]   Dictated by (CST): Guillermo Mays MD on 2/08/2024 at 3:32 PM     Finalized by (CST): Guillermo Mays MD on 2/08/2024 at 3:34 PM          Radiology review:      =====================================================================================================  Assessment and Plan    Assessment:  1. Sarcoidosis    2. High vitamin D level    3. Non-caseating granuloma    4. Pituitary adenoma (HCC)    5. Arthralgia, unspecified joint    6. Neuropathy    7. Hypervitaminosis D    8. Yeast cells and fungal elements present on diagnostic testing    9. Optic cupping of both eyes      #Suspect pulmonary sarcoid: Pulmonary nodules, mediastinal lymphadenopathy. Biopsy proven noncaseating granulomas from mediastinal lymph nodes in 1/2024  -Elevated troponin noted, per cardiology recommendation, patient will be obtaining cardiac MRI stress soon to evaluate for cardiac sarcoid  -Repeat CT of the chest from 3/2024 with stable pulmonary nodules.  -No uveitis seen in early 2024 ophthalmology examination  -No definitive extra pulmonary sarcoidosis appreciated at this juncture    #Bilateral optic cupping: Noted on last ophthalmology examination.  #Pituitary adenoma: On cabergoline.  No recent imaging of the pituitary  #Intermittent neuropathy: None currently.  Peripheral in nature  #Arthralgia: PIP discomfort without any tender or swollen joints in these regions today.  Suspect due to longstanding hypermobility    Plan:  -Pending repeat cocci antibody testing.  Last dose of borderline.  -Prevnar 20: get this at your pharmacy given your sarcoidosis/lung disease  -Will be obtaining hepatitis B vaccine  today  -Schedule nerve conduction study to evaluate for the neuropathic symptoms you are having in the bilateral hands, especially on the right fourth finger  -Schedule MRI of the pituitary with and without contrast to evaluate for pituitary sarcoid in the context of recently noted cardiac sarcoid.  -Continue a lower dose vitamin D supplement, the vitamin D in your multivitamin is fine.  Repeat 25-hydroxy vitamin D and 1, 25 dihydroxy vitamin D in 3 months.  -Let me know about the cardiac MRI results when they are back  -Patient to obtain cardiac MRI soon to evaluate for cardiac sarcoidosis.  Has seen cardiology during recent hospitalization    -Currently no indication for immunomodulatory therapy.  If there is neurosarcoid or cardiac sarcoid, consider methotrexate/leflunomide     -Return in 6 months      Diagnoses and all orders for this visit:    Sarcoidosis  -     MRI PITUITARY (W+WO) (CPT=70553); Future  -     Vitamin D; Future  -     Vitamin D; Future  -     EMG/NCV; Future    High vitamin D level  -     MRI PITUITARY (W+WO) (CPT=70553); Future  -     Vitamin D; Future  -     Vitamin D; Future    Non-caseating granuloma  -     MRI PITUITARY (W+WO) (CPT=70553); Future  -     Vitamin D; Future  -     Vitamin D; Future    Pituitary adenoma (HCC)  -     MRI PITUITARY (W+WO) (CPT=70553); Future  -     Vitamin D; Future  -     Vitamin D; Future    Arthralgia, unspecified joint  -     EMG/NCV; Future    Neuropathy  -     EMG/NCV; Future    Hypervitaminosis D  -     Vitamin D; Future  -     Vitamin D; Future    Yeast cells and fungal elements present on diagnostic testing    Optic cupping of both eyes        Return in about 6 months (around 9/20/2024).      Total time spent by me on DOS was 45 minutes on 3/20/2024   This includes:   Preparing to see the patient (review of tests, prior medical visits)  Obtaining and/or reviewing separately medically appropriate obtained history  Performing the medically appropriate exam  and/or evaluation  Clinical documentation in the EHR or other health record  Counseling and educating the patient or caregiver  Interpreting results and/or communicating results to the patient/family/caregiver  Care coordination  Ordering medications, tests, or procedures  Referring and communicating with other health care professionals  Documentation in EHR    The above plan of care, diagnosis, orders, and follow-up were discussed with the patient. Questions related to this recommended plan of care were answered.    Thank you for referring this delightful patient to me. Please feel free to contact me with any questions.     This report was performed utilizing speech recognition software technology. Despite proofreading, speech recognition errors could escape detection. If a word or phrase is confusing or out of context, please do not hesitate to call for   clarification.       Kind regards      Reji Cagle MD  [facility]

## 2024-03-20 NOTE — PATIENT INSTRUCTIONS
Prevnar 20: get this at your pharmacy given your sarcoidosis/lung disease  -Will be obtaining hepatitis B vaccine today  -Schedule nerve conduction study to evaluate for the neuropathic symptoms you are having in the bilateral hands, especially on the right fourth finger  -Schedule MRI of the pituitary with and without contrast to evaluate for pituitary sarcoid  -Continue a lower dose vitamin D supplement, the vitamin D in your multivitamin is fine.  Repeat 25-hydroxy vitamin D and 1, 25 dihydroxy vitamin D in 3 months.  -Let me know about the cardiac MRI results when they are back  -Return in 6 months

## 2024-03-24 ENCOUNTER — TELEPHONE (OUTPATIENT)
Dept: RHEUMATOLOGY | Facility: CLINIC | Age: 54
End: 2024-03-24

## 2024-03-24 DIAGNOSIS — L92.9 NON-CASEATING GRANULOMA: ICD-10-CM

## 2024-03-24 DIAGNOSIS — B49 YEAST CELLS AND FUNGAL ELEMENTS PRESENT ON DIAGNOSTIC TESTING: Primary | ICD-10-CM

## 2024-03-24 DIAGNOSIS — B37.9 YEAST CELLS AND FUNGAL ELEMENTS PRESENT ON DIAGNOSTIC TESTING: Primary | ICD-10-CM

## 2024-03-24 LAB
COCCIDIOIDES AB (ID): NOT DETECTED
COCCIDIOIDES AB, IGG: 1.2 IV
COCCIDIOIDES AB, IGM: 0.2 IV

## 2024-04-03 ENCOUNTER — PATIENT OUTREACH (OUTPATIENT)
Dept: INFECTIOUS DISEASE | Facility: CLINIC | Age: 54
End: 2024-04-03

## 2024-04-03 NOTE — PROGRESS NOTES
Recent bronchoscopy at /[facility], pathology showing granuloma, AFB and fungal stains negative, diagnosed with Sarcoid.  Rheumatology ordered a cocci titer that was read as \"Inderterminate due to anti compliment activity\". In other words the test cannot be used to determine the presence or absence of cocci. Pathology did not show any fungal infection.  She is following up at  with pulmonology.  No antifungals recommended at this time.

## 2024-04-04 ENCOUNTER — LAB SERVICES (OUTPATIENT)
Dept: LAB | Age: 54
End: 2024-04-04

## 2024-04-04 ENCOUNTER — HOSPITAL ENCOUNTER (OUTPATIENT)
Dept: MRI IMAGING | Age: 54
Discharge: HOME | End: 2024-04-04
Attending: NURSE PRACTITIONER

## 2024-04-04 DIAGNOSIS — Z86.2 HISTORY OF SARCOIDOSIS: ICD-10-CM

## 2024-04-04 LAB — HCT VFR BLD CALC: 39.4 % (ref 36–46.5)

## 2024-04-04 PROCEDURE — A9585 GADOBUTROL INJECTION: HCPCS

## 2024-04-04 PROCEDURE — 75561 CARDIAC MRI FOR MORPH W/DYE: CPT

## 2024-04-04 PROCEDURE — 10002805 HB CONTRAST AGENT

## 2024-04-04 PROCEDURE — 85014 HEMATOCRIT: CPT | Performed by: INTERNAL MEDICINE

## 2024-04-04 PROCEDURE — 36415 COLL VENOUS BLD VENIPUNCTURE: CPT | Performed by: INTERNAL MEDICINE

## 2024-04-04 PROCEDURE — 75561 CARDIAC MRI FOR MORPH W/DYE: CPT | Performed by: INTERNAL MEDICINE

## 2024-04-04 RX ORDER — GADOBUTROL 604.72 MG/ML
15 INJECTION INTRAVENOUS ONCE
Status: COMPLETED | OUTPATIENT
Start: 2024-04-04 | End: 2024-04-04

## 2024-04-04 RX ADMIN — GADOBUTROL 15 ML: 604.72 INJECTION INTRAVENOUS at 09:08

## 2024-04-15 ENCOUNTER — LAB ENCOUNTER (OUTPATIENT)
Dept: LAB | Facility: HOSPITAL | Age: 54
End: 2024-04-15
Attending: SPECIALIST
Payer: COMMERCIAL

## 2024-04-15 ENCOUNTER — OFFICE VISIT (OUTPATIENT)
Dept: OTOLARYNGOLOGY | Facility: CLINIC | Age: 54
End: 2024-04-15
Payer: COMMERCIAL

## 2024-04-15 DIAGNOSIS — R59.1 HEAD AND NECK LYMPHADENOPATHY: ICD-10-CM

## 2024-04-15 DIAGNOSIS — J34.3 NASAL TURBINATE HYPERTROPHY: ICD-10-CM

## 2024-04-15 DIAGNOSIS — D86.9 SARCOIDOSIS: ICD-10-CM

## 2024-04-15 DIAGNOSIS — J34.3 NASAL TURBINATE HYPERTROPHY: Primary | ICD-10-CM

## 2024-04-15 PROCEDURE — 99213 OFFICE O/P EST LOW 20 MIN: CPT | Performed by: SPECIALIST

## 2024-04-15 PROCEDURE — 86003 ALLG SPEC IGE CRUDE XTRC EA: CPT

## 2024-04-15 PROCEDURE — 82785 ASSAY OF IGE: CPT

## 2024-04-15 PROCEDURE — 36415 COLL VENOUS BLD VENIPUNCTURE: CPT

## 2024-04-15 RX ORDER — FLUTICASONE PROPIONATE 50 MCG
2 SPRAY, SUSPENSION (ML) NASAL DAILY
Qty: 16 ML | Refills: 5 | Status: SHIPPED | OUTPATIENT
Start: 2024-04-15 | End: 2024-10-12

## 2024-04-15 RX ORDER — MONTELUKAST SODIUM 10 MG/1
10 TABLET ORAL NIGHTLY
Qty: 30 TABLET | Refills: 5 | Status: SHIPPED | OUTPATIENT
Start: 2024-04-15

## 2024-04-16 LAB

## 2024-04-16 NOTE — PATIENT INSTRUCTIONS
Continue Flonase and Singulair for your allergic rhinitis.  I also sent you for allergy testing.  I will of course notify you of these results.  If they are negative, it is possible to also have nasal sarcoidosis.  A biopsy could be considered in this instance.

## 2024-04-16 NOTE — PROGRESS NOTES
Negative allergy testing.  Can consider nasal mucosal biopsy for sarcoidosis.  Left message for the patient.
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
## 2024-04-23 ENCOUNTER — HOSPITAL ENCOUNTER (OUTPATIENT)
Dept: MRI IMAGING | Facility: HOSPITAL | Age: 54
Discharge: HOME | End: 2024-04-23
Attending: INTERNAL MEDICINE
Payer: COMMERCIAL

## 2024-04-23 DIAGNOSIS — D35.2 PITUITARY ADENOMA (HCC): ICD-10-CM

## 2024-04-23 DIAGNOSIS — D86.9 SARCOIDOSIS: ICD-10-CM

## 2024-04-23 DIAGNOSIS — L92.9 NON-CASEATING GRANULOMA: ICD-10-CM

## 2024-04-23 DIAGNOSIS — E67.3 HIGH VITAMIN D LEVEL: ICD-10-CM

## 2024-04-23 PROCEDURE — 70553 MRI BRAIN STEM W/O & W/DYE: CPT | Performed by: INTERNAL MEDICINE

## 2024-04-23 PROCEDURE — A9575 INJ GADOTERATE MEGLUMI 0.1ML: HCPCS | Performed by: INTERNAL MEDICINE

## 2024-04-23 RX ORDER — GADOTERATE MEGLUMINE 376.9 MG/ML
15 INJECTION INTRAVENOUS
Status: COMPLETED | OUTPATIENT
Start: 2024-04-23 | End: 2024-04-23

## 2024-04-23 RX ADMIN — GADOTERATE MEGLUMINE 13 ML: 376.9 INJECTION INTRAVENOUS at 09:53:00

## 2024-05-17 ENCOUNTER — MED REC SCAN ONLY (OUTPATIENT)
Dept: DERMATOLOGY CLINIC | Facility: CLINIC | Age: 54
End: 2024-05-17

## 2024-06-14 ENCOUNTER — TELEPHONE (OUTPATIENT)
Dept: OPHTHALMOLOGY | Facility: CLINIC | Age: 54
End: 2024-06-14

## 2024-06-14 ENCOUNTER — TELEPHONE (OUTPATIENT)
Dept: INTERNAL MEDICINE CLINIC | Facility: CLINIC | Age: 54
End: 2024-06-14

## 2024-06-14 NOTE — TELEPHONE ENCOUNTER
Please advise- called patient who noticed swelling under both eyes and under eyelid of right eye a little \" bump\" almost like a mosquito bite , not itchy or painful- to

## 2024-06-14 NOTE — TELEPHONE ENCOUNTER
Called Pt. She was C/O both lower eyelids swelling with mild redness and watering that started an hour and half ago and noticing a small bump like \"a mosquito bite\" on the right eye lid.   Explained her that Dr. Ba is not in the office until 06/18/24 and gave [facility] number to schedule an appointment with Dr. Pearce's, or go to urgent care if the symptoms are getting any worse.

## 2024-06-14 NOTE — TELEPHONE ENCOUNTER
It is hard to determine what she needs this way--may be allergic, in which case can try benadryl 50mg every 8 hours for 1-2 days and see if it improves  If worsens, would need to go to UC

## 2024-06-14 NOTE — TELEPHONE ENCOUNTER
Patient called complaining of eye swelling, with some puffy ness in the corner, round bump in the right eye    Please advise

## 2024-06-14 NOTE — TELEPHONE ENCOUNTER
Per patient she has some swelling under both eyes with a bump under outside of right eye. Per patient her eyes are constantly runny, especially the right eye. Please advise

## 2024-06-21 ENCOUNTER — APPOINTMENT (OUTPATIENT)
Dept: OBGYN | Age: 54
End: 2024-06-21

## 2024-06-21 VITALS
OXYGEN SATURATION: 100 % | BODY MASS INDEX: 22.13 KG/M2 | HEART RATE: 83 BPM | TEMPERATURE: 99 F | HEIGHT: 67 IN | DIASTOLIC BLOOD PRESSURE: 86 MMHG | WEIGHT: 141 LBS | SYSTOLIC BLOOD PRESSURE: 135 MMHG

## 2024-06-21 DIAGNOSIS — Z12.31 ENCOUNTER FOR SCREENING MAMMOGRAM FOR MALIGNANT NEOPLASM OF BREAST: ICD-10-CM

## 2024-06-21 DIAGNOSIS — Z01.419 GYNECOLOGIC EXAM NORMAL: Primary | ICD-10-CM

## 2024-06-21 RX ORDER — OLOPATADINE HYDROCHLORIDE 1 MG/ML
1 SOLUTION/ DROPS OPHTHALMIC
COMMUNITY
Start: 2024-06-14

## 2024-06-21 RX ORDER — ERGOCALCIFEROL 1.25 MG/1
CAPSULE ORAL
COMMUNITY
Start: 2024-06-06

## 2024-06-21 RX ORDER — FLUTICASONE PROPIONATE 50 MCG
1 SPRAY, SUSPENSION (ML) NASAL
COMMUNITY
Start: 2024-03-14

## 2024-06-21 RX ORDER — CETIRIZINE HYDROCHLORIDE 10 MG/1
10 TABLET ORAL
COMMUNITY
Start: 2024-06-14 | End: 2024-09-12

## 2024-06-21 ASSESSMENT — PATIENT HEALTH QUESTIONNAIRE - PHQ9
2. FEELING DOWN, DEPRESSED OR HOPELESS: NOT AT ALL
SUM OF ALL RESPONSES TO PHQ9 QUESTIONS 1 AND 2: 0
SUM OF ALL RESPONSES TO PHQ9 QUESTIONS 1 AND 2: 0
1. LITTLE INTEREST OR PLEASURE IN DOING THINGS: NOT AT ALL
CLINICAL INTERPRETATION OF PHQ2 SCORE: NO FURTHER SCREENING NEEDED

## 2024-06-21 ASSESSMENT — ENCOUNTER SYMPTOMS
GASTROINTESTINAL NEGATIVE: 1
ALLERGIC/IMMUNOLOGIC COMMENTS: PCN
CONSTITUTIONAL NEGATIVE: 1

## 2024-06-28 ENCOUNTER — OFFICE VISIT (OUTPATIENT)
Dept: OTOLARYNGOLOGY | Facility: CLINIC | Age: 54
End: 2024-06-28

## 2024-06-28 DIAGNOSIS — R22.1 NECK MASS: Primary | ICD-10-CM

## 2024-06-28 PROCEDURE — 99213 OFFICE O/P EST LOW 20 MIN: CPT | Performed by: OTOLARYNGOLOGY

## 2024-06-28 RX ORDER — CETIRIZINE HYDROCHLORIDE 10 MG/1
10 TABLET ORAL AS DIRECTED
COMMUNITY
Start: 2024-06-14 | End: 2024-09-12

## 2024-06-28 RX ORDER — MONTELUKAST SODIUM 10 MG/1
10 TABLET ORAL NIGHTLY
Qty: 30 TABLET | Refills: 3 | Status: SHIPPED | OUTPATIENT
Start: 2024-06-28

## 2024-06-28 RX ORDER — OLOPATADINE HYDROCHLORIDE 1 MG/ML
1 SOLUTION/ DROPS OPHTHALMIC
COMMUNITY
Start: 2024-06-14
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
## 2024-07-19 ENCOUNTER — TELEPHONE (OUTPATIENT)
Dept: INTERNAL MEDICINE CLINIC | Facility: CLINIC | Age: 54
End: 2024-07-19

## 2024-07-19 DIAGNOSIS — U07.1 COVID-19 VIRUS INFECTION: Primary | ICD-10-CM

## 2024-07-19 LAB — AMB EXT COVID-19 RESULT: DETECTED

## 2024-07-19 NOTE — TELEPHONE ENCOUNTER
Pt. Called stating she just tested positive for covid today.  She is asking if there is anything she should be doing for it now. She has a headache, fever of 101, sore throat, little diarrhea, and some chills.  She mentions that her heart rate is high at 109.

## 2024-07-19 NOTE — TELEPHONE ENCOUNTER
Respiratory infection triage:    Fever:  []  No fever  [x]  Fever>100.4  101    Cough:  [] Tight cough  [] Cough with exertion  [] Dry cough  [] Sputum production, Color:     Breathing:  [] Mild shortness of breath interfering with activity  [] Wheezing  [] Pain with deep breathing  [] Using inhaler    Other symptoms:  [x] Sore throat  [] Difficulty swallowing  [x] Nasal drainage/congestion:runny  [x] Sinus congestion/pressure:postnasal drip  [x] Ear pain:itchy  [] Body aches  [] Poor appetite  [] Loss of sense of smell   [] Loss of sense of taste  [x]Conjunctivitis?watery    [] Any recent travel?  [] Any sick contacts?  [] Are you a healthcare worker?        ADDITIONAL NOTES:    Please advise -called patient who started with symptoms yesterday - tested positive today for covid - will be using [facility] . RN instructed  patient to monitor for high fever ,Shortness of breath., chest pain or oxygen below 90% which would all warrant ER visit - to         Notified patient that we will route this message to the doctor and see what their recommendations would be. In the meantime, if anything worsens, they were advised to call back or seek emergent evaluation.

## 2024-07-19 NOTE — TELEPHONE ENCOUNTER
Spoke w patient    Pt should use flonase and zyrtec daily to help with sinus congestion.  Can try steam 3-4 times daily  Would sleep propped up on a few pillows  For headache and fever--tylenol alternating advil    Sent in rx for paxlovid

## 2024-08-01 ENCOUNTER — HOSPITAL ENCOUNTER (OUTPATIENT)
Dept: MAMMOGRAPHY | Age: 54
Discharge: HOME | End: 2024-08-01
Attending: INTERNAL MEDICINE
Payer: COMMERCIAL

## 2024-08-01 DIAGNOSIS — Z12.31 ENCOUNTER FOR SCREENING MAMMOGRAM FOR MALIGNANT NEOPLASM OF BREAST: ICD-10-CM

## 2024-08-01 DIAGNOSIS — R92.30 DENSE BREAST: ICD-10-CM

## 2024-08-01 PROCEDURE — 77063 BREAST TOMOSYNTHESIS BI: CPT | Performed by: INTERNAL MEDICINE

## 2024-08-01 PROCEDURE — 77067 SCR MAMMO BI INCL CAD: CPT | Performed by: INTERNAL MEDICINE

## 2024-08-02 ENCOUNTER — PATIENT MESSAGE (OUTPATIENT)
Dept: INTERNAL MEDICINE CLINIC | Facility: CLINIC | Age: 54
End: 2024-08-02

## 2024-08-02 ENCOUNTER — TELEPHONE (OUTPATIENT)
Dept: OBGYN | Age: 54
End: 2024-08-02

## 2024-08-02 DIAGNOSIS — R92.30 BREAST DENSITY: Primary | ICD-10-CM

## 2024-08-09 ENCOUNTER — LAB ENCOUNTER (OUTPATIENT)
Dept: LAB | Facility: HOSPITAL | Age: 54
End: 2024-08-09
Attending: INTERNAL MEDICINE
Payer: COMMERCIAL

## 2024-08-09 DIAGNOSIS — D35.2 PITUITARY ADENOMA (HCC): Primary | ICD-10-CM

## 2024-08-09 DIAGNOSIS — E83.52 HYPERCALCEMIA: ICD-10-CM

## 2024-08-09 LAB
ALBUMIN SERPL-MCNC: 4.3 G/DL (ref 3.2–4.8)
ALBUMIN/GLOB SERPL: 1.3 {RATIO} (ref 1–2)
ALP LIVER SERPL-CCNC: 86 U/L
ALT SERPL-CCNC: 22 U/L
ANION GAP SERPL CALC-SCNC: 5 MMOL/L (ref 0–18)
AST SERPL-CCNC: 23 U/L (ref ?–34)
BILIRUB SERPL-MCNC: 0.7 MG/DL (ref 0.3–1.2)
BUN BLD-MCNC: 16 MG/DL (ref 9–23)
CALCIUM BLD-MCNC: 10.3 MG/DL (ref 8.7–10.4)
CHLORIDE SERPL-SCNC: 105 MMOL/L (ref 98–112)
CO2 SERPL-SCNC: 29 MMOL/L (ref 21–32)
CREAT BLD-MCNC: 0.86 MG/DL
EGFRCR SERPLBLD CKD-EPI 2021: 79 ML/MIN/1.73M2 (ref 60–?)
FASTING STATUS PATIENT QL REPORTED: YES
GLOBULIN PLAS-MCNC: 3.4 G/DL (ref 2–3.5)
GLUCOSE BLD-MCNC: 83 MG/DL (ref 70–99)
OSMOLALITY SERPL CALC.SUM OF ELEC: 288 MOSM/KG (ref 275–295)
POTASSIUM SERPL-SCNC: 3.9 MMOL/L (ref 3.5–5.1)
PROLACTIN SERPL-MCNC: 3.8 NG/ML
PROT SERPL-MCNC: 7.7 G/DL (ref 5.7–8.2)
PTH-INTACT SERPL-MCNC: 83.2 PG/ML (ref 18.5–88)
SODIUM SERPL-SCNC: 139 MMOL/L (ref 136–145)
T4 FREE SERPL-MCNC: 1.3 NG/DL (ref 0.8–1.7)
TSI SER-ACNC: 1.52 MIU/ML (ref 0.55–4.78)

## 2024-08-09 PROCEDURE — 80053 COMPREHEN METABOLIC PANEL: CPT

## 2024-08-09 PROCEDURE — 83970 ASSAY OF PARATHORMONE: CPT

## 2024-08-09 PROCEDURE — 84146 ASSAY OF PROLACTIN: CPT

## 2024-08-09 PROCEDURE — 84439 ASSAY OF FREE THYROXINE: CPT

## 2024-08-09 PROCEDURE — 36415 COLL VENOUS BLD VENIPUNCTURE: CPT

## 2024-08-09 PROCEDURE — 84443 ASSAY THYROID STIM HORMONE: CPT

## 2024-08-23 ENCOUNTER — HOSPITAL ENCOUNTER (OUTPATIENT)
Dept: MRI IMAGING | Facility: HOSPITAL | Age: 54
Discharge: HOME | End: 2024-08-23
Attending: OTOLARYNGOLOGY
Payer: COMMERCIAL

## 2024-08-23 DIAGNOSIS — R22.1 NECK MASS: ICD-10-CM

## 2024-08-23 PROCEDURE — A9575 INJ GADOTERATE MEGLUMI 0.1ML: HCPCS | Performed by: OTOLARYNGOLOGY

## 2024-08-23 PROCEDURE — 70543 MRI ORBT/FAC/NCK W/O &W/DYE: CPT | Performed by: OTOLARYNGOLOGY

## 2024-08-23 RX ORDER — GADOTERATE MEGLUMINE 376.9 MG/ML
15 INJECTION INTRAVENOUS
Status: COMPLETED | OUTPATIENT
Start: 2024-08-23 | End: 2024-08-23

## 2024-08-23 RX ADMIN — GADOTERATE MEGLUMINE 13 ML: 376.9 INJECTION INTRAVENOUS at 13:04:00

## 2024-08-26 ENCOUNTER — TELEPHONE (OUTPATIENT)
Dept: OTOLARYNGOLOGY | Facility: CLINIC | Age: 54
End: 2024-08-26

## 2024-08-26 NOTE — TELEPHONE ENCOUNTER
Message sent to patient per Dr. Broussard: \"Good morning.  Your MRI shows that the mass that we last imaged 3 years ago has decreased in size by about a centimeter which is good news.  At this point I do not think any further intervention is indicated I would simply just recommend repeating another MRI in a few years.  If you wish to go over the results of the MRI please contact our office and make an appointment to be seen otherwise please return to my office in 1 to 2 years for reevaluation and to discuss further management.\"

## 2024-09-19 ENCOUNTER — LAB ENCOUNTER (OUTPATIENT)
Dept: LAB | Facility: HOSPITAL | Age: 54
End: 2024-09-19
Attending: INTERNAL MEDICINE
Payer: COMMERCIAL

## 2024-09-19 DIAGNOSIS — D86.9 SARCOIDOSIS: ICD-10-CM

## 2024-09-19 DIAGNOSIS — E67.3 HIGH VITAMIN D LEVEL: ICD-10-CM

## 2024-09-19 DIAGNOSIS — E67.3 HYPERVITAMINOSIS D: ICD-10-CM

## 2024-09-19 DIAGNOSIS — L92.9 NON-CASEATING GRANULOMA: ICD-10-CM

## 2024-09-19 DIAGNOSIS — D35.2 PITUITARY ADENOMA (HCC): ICD-10-CM

## 2024-09-19 LAB — VIT D+METAB SERPL-MCNC: 38.6 NG/ML (ref 30–100)

## 2024-09-19 PROCEDURE — 36415 COLL VENOUS BLD VENIPUNCTURE: CPT

## 2024-09-19 PROCEDURE — 82306 VITAMIN D 25 HYDROXY: CPT

## 2024-09-20 ENCOUNTER — OFFICE VISIT (OUTPATIENT)
Dept: RHEUMATOLOGY | Facility: CLINIC | Age: 54
End: 2024-09-20
Payer: COMMERCIAL

## 2024-09-20 VITALS
HEIGHT: 67 IN | TEMPERATURE: 98 F | SYSTOLIC BLOOD PRESSURE: 100 MMHG | DIASTOLIC BLOOD PRESSURE: 78 MMHG | OXYGEN SATURATION: 98 % | HEART RATE: 82 BPM | RESPIRATION RATE: 16 BRPM | WEIGHT: 141 LBS | BODY MASS INDEX: 22.13 KG/M2

## 2024-09-20 DIAGNOSIS — D86.9 SARCOIDOSIS: Primary | ICD-10-CM

## 2024-09-20 DIAGNOSIS — R09.02 HYPOXIA: ICD-10-CM

## 2024-09-20 DIAGNOSIS — G62.9 NEUROPATHY: ICD-10-CM

## 2024-09-20 DIAGNOSIS — L92.9 NON-CASEATING GRANULOMA: ICD-10-CM

## 2024-09-20 DIAGNOSIS — E67.3 HYPERVITAMINOSIS D: ICD-10-CM

## 2024-09-20 DIAGNOSIS — R06.83 SNORING: ICD-10-CM

## 2024-09-20 PROCEDURE — 3078F DIAST BP <80 MM HG: CPT | Performed by: INTERNAL MEDICINE

## 2024-09-20 PROCEDURE — 3008F BODY MASS INDEX DOCD: CPT | Performed by: INTERNAL MEDICINE

## 2024-09-20 PROCEDURE — 3074F SYST BP LT 130 MM HG: CPT | Performed by: INTERNAL MEDICINE

## 2024-09-20 PROCEDURE — 99214 OFFICE O/P EST MOD 30 MIN: CPT | Performed by: INTERNAL MEDICINE

## 2024-09-20 PROCEDURE — G2211 COMPLEX E/M VISIT ADD ON: HCPCS | Performed by: INTERNAL MEDICINE

## 2024-09-20 NOTE — PROGRESS NOTES
Rheumatology f/u Patient Note  =====================================================================================================    Chief complaint: Noncaseating granulomas  Chief Complaint   Patient presents with    Follow - Up     Patient comes into the office today for a f/u apt with Dr for Sarcoidosis and Non Caseating granuloma. Patient states that she is feeling about the same and no worse. Rapid 3 converted to a 0.7.     PCP  Brenda Riely DO  Fax: [phone]  Phone: [phone]    =====================================================================================================  HPI    Date of visit: 2/14/2024    Purvi Sidhu is a 54 year old female     Here for evaluation of sarcoidosis.  -3-4 years back, developed significant hand stiffness/pain. -previously doublejointed, poping joints frequently, stopped this in 2021.   -Nov 2023: Chest X-ray completed. Then CT Chest completed with scattered pulmonary nodules and mediastinal lymphadenopathy, s/p EBUS with TBBx demonstrated non-caseating granulomas  -saw ophtho: no ocular sarcoid is noted.  Recently diagnosed with optic neuropathy due to suspected glaucoma.  -had tingling in fingers/toes, went to ER. Significant anxiety is noted.  No numbness and tingling currently.  -troponin was elevated. Saw cardiology in hospital, recommended MRI cardiac stress wwo.  To workup cardiac sarcoid.  -walking 2 miles per day.   -lost 10 lbs unintentionally.  Lots of stress  -travel internationally and domestically. Including [place], [place].  Has been to [place], [place] in the past.  -working as  specialist.   -Hx of hyperparathyroidism, s/p resection.  -Pituitary adenoma history, follows with endocrinology.  Fhx:  2 1st cousins with SLE    Stepfather with pancreatic cancer  Mother with lung cancer  ==============================================================================================================  Visit: 03/20/24  Seeing Dr. Navarro  (sarcoid specialist) in May 2024.  Recently saw pulmonary.  Repeated CT of the chest, pulmonary nodules are stable.  -getting cardiac MRI wwo contrast.  Will be in April.  -Anxious, mother with lung cancer, stepfather with pancreatic cancer.  -tingling in the hands are intermittent.   -Stopped her vitamin D supplement given elevated 1, 25-dihydroxy vitamin D  -Recent nuclear stress test was normal.  ==============================================================================================================  Today's Visit: 09/20/24    Doing well. Cardiac MRI wwo wnl. Saw Dr. Navarro (sarcoid specialist), no significant extra-pulmonary sarcoid seen. No need for immunomodulators  -still with tingling in the hands, mostly in the left fingers  -decreased vitamin D supplement given hypercalcemia per endo  -nocturnal hypoxia to high 70s in the evening, snoring is noted.     5 point ROS negative except noted above  I had reviewed past medical and family histories together with allergy and medication lists documented.      Medications:  Current Outpatient Medications on File Prior to Visit   Medication Sig Dispense Refill    montelukast 10 MG Oral Tab Take 1 tablet (10 mg total) by mouth nightly. 30 tablet 5    fluticasone propionate 50 MCG/ACT Nasal Suspension 2 sprays by Each Nare route daily. 16 mL 5    cabergoline 0.5 MG Oral Tab Take 0.5 tablets (0.25 mg total) by mouth once a week.      ERGOCALCIFEROL 1.25 MG (89423 UT) Oral Cap TAKE ONE WEEKLY FOR 12 WEEKS. (Patient taking differently: Take 1 capsule (50,000 Units total) by mouth As Directed. Every 14 days.) 12 capsule 0    Multiple Vitamins-Minerals (WOMENS MULTIVITAMIN PLUS) Oral Tab Take 1 tablet by mouth daily.       No current facility-administered medications on file prior to visit.   Allergies:  Allergies   Allergen Reactions    Amoxicillin HIVES    Penicillins HIVES         Objective    Vitals:    09/20/24 0855   BP: 100/78   Pulse: 82   Resp: 16   Temp: 97.8  °F (36.6 °C)   SpO2: 98%   Weight: 141 lb (64 kg)   Height: 5' 7\" (1.702 m)     GEN: NAD, well-nourished.   HEENT: Head: NCAT. Face: No lesions. Eyes: Conjunctiva clear. Sclera are anicteric. PERRLA. EOMs are full.   CV: RRR, no mrg, S1/S2  PULM:  CTAB, easy effort  Extremities: No cyanosis, edema or deformities.   Neurologic: Strength, CN2-12 grossly intact   Psych: normal affect.   Skin: No lesions or rashes.  MSK: 28 joint count performed. No evidence of synovitis in mcp, pip, dip, wrist, elbows, shoulders, hips, knees, ankles, mtp unless otherwise noted. Full ROM of elbows, wrists, knees.     Labs:    Lab Results   Component Value Date    VITD 38.6 09/19/2024    PTH 83.2 08/09/2024 2/2024  Urine histo negative Quant gold negative  SPEP WNL  Serum crypto negative  Cocci CF negative, cocci IgG borderline  Cocci IgG DVTP negative  Serum histo antibody negative  Blasto negative  Vitamin D WNL  1, 25 dihydroxy vitamin D 93.0, upper limit of normal 81.5  B12, folate negative  Ferritin WNL  TSH WNL  Vitamin D63.7  Hepatitis B surface antibody negative  HCV neg    1/2024  TREE, RF negative  ACE neg  MPO/PR3    1/2024 TBBx Mediastinal lymph node   Non-caseating granulomas.  No organisms seen with AFB and GMS stains.  No malignant cells seen.      Lab Results   Component Value Date    ESRML 6 02/09/2024    ESRML 16 01/18/2024    ESRML 14 01/07/2021    CRP <0.29 02/09/2024    CRP <0.29 01/07/2021        Lab Results   Component Value Date    WBC 4.7 02/08/2024    RBC 4.15 02/08/2024    HGB 13.2 02/08/2024    HCT 39.2 02/08/2024    .0 (L) 02/08/2024    MPV 9.1 05/26/2018    MCV 94.5 02/08/2024    MCH 31.8 02/08/2024    MCHC 33.7 02/08/2024    RDW 12.7 02/08/2024    NEPRELIM 2.48 02/08/2024    NEUT 4.6 05/12/2016    LYMPH 1.9 05/12/2016    MON 0.4 05/12/2016    EOS 0.4 05/12/2016    BASO 0.1 05/12/2016    NEPERCENT 52.7 02/08/2024    LYPERCENT 41.6 02/08/2024    MOPERCENT 4.5 02/08/2024    EOPERCENT 0.8 02/08/2024     BAPERCENT 0.4 02/08/2024    NE 2.48 02/08/2024    LYMABS 1.96 02/08/2024    MOABSO 0.21 02/08/2024    EOABSO 0.04 02/08/2024    BAABSO 0.02 02/08/2024     Lab Results   Component Value Date    GLU 83 08/09/2024    BUN 16 08/09/2024    BUNCREA 11.6 01/24/2024    CREATSERUM 0.86 08/09/2024    ANIONGAP 5 08/09/2024    GFRNAA 75 02/25/2021    GFRAA 86 02/25/2021    CA 10.3 08/09/2024    OSMOCALC 288 08/09/2024    ALKPHO 86 08/09/2024    AST 23 08/09/2024    ALT 22 08/09/2024    ALKPHOS 67 04/21/2016    BILT 0.7 08/09/2024    TP 7.7 08/09/2024    ALB 4.3 08/09/2024    GLOBULIN 3.4 08/09/2024    AGRATIO 1.1 04/21/2016     08/09/2024    K 3.9 08/09/2024     08/09/2024    CO2 29.0 08/09/2024         Lab Results   Component Value Date    ANASCRN Positive (A) 01/07/2021     No results found for: \"SSA\", \"SSAUR\", \"ANTISSA\", \"SSA52\", \"SSA60\", \"SSADD\", \"SSB\", \"ANTISSB\"  Lab Results   Component Value Date    DSDNA <10 04/05/2021    SMITHRNP Negative 04/05/2021     No results found for: \"SCL70\", \"SCL\", \"HREKNEU40\"  Lab Results   Component Value Date    C3 85.8 (L) 02/09/2021    C4 27.8 02/09/2021     No results found for: \"DRVVT\", \"LAINT\", \"PTTLUPUS\", \"LUPUSINTERP\", \"LA\", \"N5IE4BPEYJ\", \"C7AP3LDOMD\", \"C4WNNGUBDB\", \"K1JRVEEQBS\"  No results found for: \"CARDIOLIPIGG\", \"CARDIOLIPIGM\", \"CARDIOLIPIGA\", \"CARDIOIGA\", \"CARLIP\"      Additional Labs:    Radiology:  MRI NECK (W+WO)(CPT=70543)    Result Date: 8/23/2024  CONCLUSION:   1. 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  2. The right-sided neck lymph nodes are stable in size likely represent reactive lymph nodes.   LOCATION:  [facility]   Dictated by (CST): Guillermo Mays MD on 8/23/2024 at 1:48 PM     Finalized by (CST): Guillermo Mays MD on 8/23/2024 at 1:57 PM       [facility] COLE 2D+3D SCREENING BILAT (CPT=77067/15449)    Result Date: 8/1/2024  CONCLUSION:   BI-RADS CATEGORY:  DIAGNOSTIC CATEGORY  1--NEGATIVE ASSESSMENT.   RECOMMENDATIONS:  ROUTINE MAMMOGRAM AND CLINICAL EVALUATION IN 12 MONTHS.   Because of breast density, this patient may benefit from supplemental screening with breast MRI, Molecular Breast Imaging (MBI) or bilateral whole breast ultrasound for increased sensitivity for detection of cancer which can be obscured mammographically.   Please contact your ordering provider to discuss supplemental screening options.    A letter explaining the results in lay terms has been sent to the patient.  This exam was evaluated with a computer-aided device.  This patient's information has been entered into a reminder system with a target due date for the next mammogram.   LOCATION:  [facility]   Dictated by (CST): David Pizano MD on 8/01/2024 at 12:44 PM     Finalized by (CST): David Pizano MD on 8/01/2024 at 12:47 PM          Radiology review:      =====================================================================================================  Assessment and Plan    Assessment:  1. Sarcoidosis    2. Hypervitaminosis D    3. Non-caseating granuloma    4. Neuropathy    5. Hypoxia    6. Snoring      #pulmonary sarcoid: Pulmonary nodules, mediastinal lymphadenopathy. Biopsy proven noncaseating granulomas from mediastinal lymph nodes in 1/2024  -Elevated troponin noted, cardiac MRI stress normal.   -MRI pituitary did not confirm sarcoid pituitary.   -Repeat CT of the chest from 3/2024 with stable pulmonary nodules.  -No uveitis seen in early 2024 ophthalmology examination  -No definitive extra pulmonary sarcoidosis appreciated at this juncture; confirmed by Dr. Navarro (sarcoid specialist).     #Bilateral optic cupping: Noted on last ophthalmology examination.  #Pituitary adenoma: On cabergoline.  Stable, sees endo.   #Intermittent neuropathy: None currently.  Peripheral in nature  #Arthralgia: PIP discomfort without any tender or swollen joints in these regions today.  Suspect due to longstanding  hypermobility  #Nocturnal hypoxia: Down to the high 70s.  PSG 15 years ago normal.  Snoring noted    Plan:  -Get flu/COVID vaccines this season.  Get shingles.  -Schedule nerve conduction study to evaluate for the neuropathic symptoms you are having in the bilateral hands, especially on the right fourth finger  -Hypervitaminosis D due to increased conversion to active vitamin D from granulomas.  She has scaled back vitamin D and the most recent inactive vitamin D was normal.  She declined the 1, 25-dihydroxy vitamin D recently.  I repeated this test in 3 months  -PSG to evaluate nocturnal hypoxia and snoring.  Query sleep disordered breathing.  -Currently no indication for immunomodulatory therapy.  If there is neurosarcoid or cardiac sarcoid, consider methotrexate/leflunomide   -Has follow-up CT imaging per sarcoid specialist at [facility].    -rtc June 2025      Diagnoses and all orders for this visit:    Sarcoidosis  -     Vitamin D; Future  -     EMG/NCV; Future    Hypervitaminosis D  -     Vitamin D; Future  -     EMG/NCV; Future    Non-caseating granuloma  -     Vitamin D; Future  -     EMG/NCV; Future    Neuropathy  -     EMG/NCV; Future    Hypoxia  -     Diagnostic Sleep Study-split night PAP implemented if criteria met  -     General sleep study; Future    Snoring  -     Diagnostic Sleep Study-split night PAP implemented if criteria met  -     General sleep study; Future          Return in about 9 months (around 6/20/2025).    The above plan of care, diagnosis, orders, and follow-up were discussed with the patient. Questions related to this recommended plan of care were answered.    Thank you for referring this delightful patient to me. Please feel free to contact me with any questions.     This report was performed utilizing speech recognition software technology. Despite proofreading, speech recognition errors could escape detection. If a word or phrase is confusing or out of context, please do not  hesitate to call for   clarification.       Kind regards      Reji Cagle MD  [facility]

## 2024-10-05 ENCOUNTER — HOSPITAL ENCOUNTER (OUTPATIENT)
Dept: MAMMOGRAPHY | Facility: HOSPITAL | Age: 54
Discharge: HOME | End: 2024-10-05
Attending: OBSTETRICS & GYNECOLOGY
Payer: COMMERCIAL

## 2024-10-05 DIAGNOSIS — R92.30 BREAST DENSITY: ICD-10-CM

## 2024-10-05 PROCEDURE — 76641 ULTRASOUND BREAST COMPLETE: CPT | Performed by: OBSTETRICS & GYNECOLOGY

## 2024-10-07 ENCOUNTER — TELEPHONE (OUTPATIENT)
Dept: OBGYN | Age: 54
End: 2024-10-07

## 2024-11-01 ENCOUNTER — PATIENT MESSAGE (OUTPATIENT)
Dept: OTOLARYNGOLOGY | Facility: CLINIC | Age: 54
End: 2024-11-01

## 2024-11-01 RX ORDER — LEVOCETIRIZINE DIHYDROCHLORIDE 5 MG/1
5 TABLET, FILM COATED ORAL EVERY EVENING
Qty: 30 TABLET | Refills: 3 | Status: SHIPPED | OUTPATIENT
Start: 2024-11-01

## 2024-11-01 NOTE — TELEPHONE ENCOUNTER
I placed an order for xyzal.  This may be covered.  The zyrtec wasn't.  This gave me a question rachel means uncertain.  Both xyzal and zyrtec are OTC.

## 2024-11-01 NOTE — TELEPHONE ENCOUNTER
Purvi Barrera is requesting Zyrtec or something similar for her allergies.  Your office visit note in June said to start Montelukast and Zyrtec and cont with Flonase. Zyrtec was an externally reported medication, is it ok if I send in a new script for it?

## 2024-11-12 ENCOUNTER — LAB ENCOUNTER (OUTPATIENT)
Dept: LAB | Facility: HOSPITAL | Age: 54
End: 2024-11-12
Attending: INTERNAL MEDICINE
Payer: COMMERCIAL

## 2024-11-12 DIAGNOSIS — E55.9 VITAMIN D DEFICIENCY: Primary | ICD-10-CM

## 2024-11-12 DIAGNOSIS — D86.9 SARCOIDOSIS: ICD-10-CM

## 2024-11-12 DIAGNOSIS — R91.8 PULMONARY NODULES: ICD-10-CM

## 2024-11-12 LAB
ALBUMIN SERPL-MCNC: 4.4 G/DL (ref 3.2–4.8)
ALBUMIN/GLOB SERPL: 1.3 {RATIO} (ref 1–2)
ALP LIVER SERPL-CCNC: 80 U/L
ALT SERPL-CCNC: 21 U/L
ANION GAP SERPL CALC-SCNC: 1 MMOL/L (ref 0–18)
AST SERPL-CCNC: 25 U/L (ref ?–34)
BASOPHILS # BLD AUTO: 0.03 X10(3) UL (ref 0–0.2)
BASOPHILS NFR BLD AUTO: 0.8 %
BILIRUB SERPL-MCNC: 0.8 MG/DL (ref 0.3–1.2)
BILIRUB UR QL STRIP.AUTO: NEGATIVE
BUN BLD-MCNC: 13 MG/DL (ref 9–23)
CALCIUM BLD-MCNC: 10.3 MG/DL (ref 8.7–10.4)
CD19 CELLS NFR SPEC: 11 %
CD3 CELLS NFR SPEC: 71 %
CD3+CD4+ CELLS # BLD: 1092 /MM3
CD3+CD4+ CELLS NFR BLD: 55 %
CD3+CD4+ CELLS/CD3+CD8+ CLL SPEC: 3.4
CD3+CD8+ CELLS NFR SPEC: 16 %
CD3-CD16+CD56+ CELLS NFR SPEC: 17 %
CHLORIDE SERPL-SCNC: 105 MMOL/L (ref 98–112)
CHOLEST SERPL-MCNC: 179 MG/DL (ref ?–200)
CLARITY UR REFRACT.AUTO: CLEAR
CO2 SERPL-SCNC: 32 MMOL/L (ref 21–32)
CREAT BLD-MCNC: 0.87 MG/DL
CRP SERPL-MCNC: <0.4 MG/DL (ref ?–0.5)
DEPRECATED HBV CORE AB SER IA-ACNC: 96 NG/ML
EGFRCR SERPLBLD CKD-EPI 2021: 78 ML/MIN/1.73M2 (ref 60–?)
EOSINOPHIL # BLD AUTO: 0.06 X10(3) UL (ref 0–0.7)
EOSINOPHIL NFR BLD AUTO: 1.6 %
ERYTHROCYTE [DISTWIDTH] IN BLOOD BY AUTOMATED COUNT: 12.7 %
ERYTHROCYTE [SEDIMENTATION RATE] IN BLOOD: 27 MM/HR
FASTING STATUS PATIENT QL REPORTED: YES
GLOBULIN PLAS-MCNC: 3.5 G/DL (ref 2–3.5)
GLUCOSE BLD-MCNC: 73 MG/DL (ref 70–99)
GLUCOSE UR STRIP.AUTO-MCNC: NORMAL MG/DL
HCT VFR BLD AUTO: 40 %
HDLC SERPL-MCNC: 74 MG/DL (ref 40–59)
HGB BLD-MCNC: 13.6 G/DL
IGA SERPL-MCNC: 178.4 MG/DL (ref 40–350)
IGM SERPL-MCNC: 95.3 MG/DL (ref 50–300)
IMM GRANULOCYTES # BLD AUTO: 0 X10(3) UL (ref 0–1)
IMM GRANULOCYTES NFR BLD: 0 %
IMMUNOGLOBULIN PNL SER-MCNC: 2008 MG/DL (ref 650–1600)
KETONES UR STRIP.AUTO-MCNC: NEGATIVE MG/DL
LDH SERPL L TO P-CCNC: 142 U/L
LDLC SERPL CALC-MCNC: 96 MG/DL (ref ?–100)
LEUKOCYTE ESTERASE UR QL STRIP.AUTO: 500
LYMPHOCYTES # BLD AUTO: 2.01 X10(3) UL (ref 1–4)
LYMPHOCYTES NFR BLD AUTO: 52.5 %
MCH RBC QN AUTO: 32.5 PG (ref 26–34)
MCHC RBC AUTO-ENTMCNC: 34 G/DL (ref 31–37)
MCV RBC AUTO: 95.5 FL
MONOCYTES # BLD AUTO: 0.16 X10(3) UL (ref 0.1–1)
MONOCYTES NFR BLD AUTO: 4.2 %
NEUTROPHILS # BLD AUTO: 1.57 X10 (3) UL (ref 1.5–7.7)
NEUTROPHILS # BLD AUTO: 1.57 X10(3) UL (ref 1.5–7.7)
NEUTROPHILS NFR BLD AUTO: 40.9 %
NITRITE UR QL STRIP.AUTO: NEGATIVE
NONHDLC SERPL-MCNC: 105 MG/DL (ref ?–130)
NT-PROBNP SERPL-MCNC: 245 PG/ML (ref ?–125)
OSMOLALITY SERPL CALC.SUM OF ELEC: 285 MOSM/KG (ref 275–295)
PH UR STRIP.AUTO: 7 [PH] (ref 5–8)
PLATELET # BLD AUTO: 125 10(3)UL (ref 150–450)
PLATELETS.RETICULATED NFR BLD AUTO: 5.8 % (ref 0–7)
POTASSIUM SERPL-SCNC: 4 MMOL/L (ref 3.5–5.1)
PROT SERPL-MCNC: 7.9 G/DL (ref 5.7–8.2)
PROT UR STRIP.AUTO-MCNC: NEGATIVE MG/DL
PTH-INTACT SERPL-MCNC: 101.2 PG/ML (ref 18.5–88)
RBC # BLD AUTO: 4.19 X10(6)UL
RBC UR QL AUTO: NEGATIVE
SODIUM SERPL-SCNC: 138 MMOL/L (ref 136–145)
SP GR UR STRIP.AUTO: 1.02 (ref 1–1.03)
TRIGL SERPL-MCNC: 47 MG/DL (ref 30–149)
TROPONIN I SERPL HS-MCNC: 64 NG/L
UROBILINOGEN UR STRIP.AUTO-MCNC: NORMAL MG/DL
VIT D+METAB SERPL-MCNC: 38 NG/ML (ref 30–100)
VLDLC SERPL CALC-MCNC: 8 MG/DL (ref 0–30)
WBC # BLD AUTO: 3.8 X10(3) UL (ref 4–11)

## 2024-11-12 PROCEDURE — 84484 ASSAY OF TROPONIN QUANT: CPT

## 2024-11-12 PROCEDURE — 83520 IMMUNOASSAY QUANT NOS NONAB: CPT

## 2024-11-12 PROCEDURE — 82330 ASSAY OF CALCIUM: CPT

## 2024-11-12 PROCEDURE — 82728 ASSAY OF FERRITIN: CPT

## 2024-11-12 PROCEDURE — 86357 NK CELLS TOTAL COUNT: CPT

## 2024-11-12 PROCEDURE — 85025 COMPLETE CBC W/AUTO DIFF WBC: CPT

## 2024-11-12 PROCEDURE — 83880 ASSAY OF NATRIURETIC PEPTIDE: CPT

## 2024-11-12 PROCEDURE — 81001 URINALYSIS AUTO W/SCOPE: CPT

## 2024-11-12 PROCEDURE — 86360 T CELL ABSOLUTE COUNT/RATIO: CPT

## 2024-11-12 PROCEDURE — 83970 ASSAY OF PARATHORMONE: CPT

## 2024-11-12 PROCEDURE — 36415 COLL VENOUS BLD VENIPUNCTURE: CPT

## 2024-11-12 PROCEDURE — 86359 T CELLS TOTAL COUNT: CPT

## 2024-11-12 PROCEDURE — 82652 VIT D 1 25-DIHYDROXY: CPT

## 2024-11-12 PROCEDURE — 82784 ASSAY IGA/IGD/IGG/IGM EACH: CPT

## 2024-11-12 PROCEDURE — 82306 VITAMIN D 25 HYDROXY: CPT

## 2024-11-12 PROCEDURE — 80053 COMPREHEN METABOLIC PANEL: CPT

## 2024-11-12 PROCEDURE — 86355 B CELLS TOTAL COUNT: CPT

## 2024-11-12 PROCEDURE — 85652 RBC SED RATE AUTOMATED: CPT

## 2024-11-12 PROCEDURE — 83615 LACTATE (LD) (LDH) ENZYME: CPT

## 2024-11-12 PROCEDURE — 86140 C-REACTIVE PROTEIN: CPT

## 2024-11-12 PROCEDURE — 82164 ANGIOTENSIN I ENZYME TEST: CPT

## 2024-11-12 PROCEDURE — 80061 LIPID PANEL: CPT

## 2024-11-13 LAB
ACE: 79 U/L
LC CALCIUM, IONIZED: 5.4 MG/DL

## 2024-11-14 LAB — VIT D 1,25 DI-OH: 90.1 PG/ML

## 2024-11-17 ENCOUNTER — OFFICE VISIT (OUTPATIENT)
Dept: SLEEP CENTER | Age: 54
End: 2024-11-17
Attending: Other
Payer: COMMERCIAL

## 2024-11-17 DIAGNOSIS — R06.83 SNORING: ICD-10-CM

## 2024-11-17 DIAGNOSIS — R09.02 HYPOXIA: ICD-10-CM

## 2024-11-17 PROCEDURE — 95810 POLYSOM 6/> YRS 4/> PARAM: CPT

## 2024-11-19 LAB — Lab: <31.2 PG/ML

## 2024-11-22 ENCOUNTER — SLEEP STUDY (OUTPATIENT)
Facility: CLINIC | Age: 54
End: 2024-11-22
Payer: COMMERCIAL

## 2024-11-22 DIAGNOSIS — G47.33 OBSTRUCTIVE SLEEP APNEA SYNDROME: Primary | ICD-10-CM

## 2024-11-22 PROCEDURE — 95810 POLYSOM 6/> YRS 4/> PARAM: CPT | Performed by: OTHER

## 2024-11-26 ENCOUNTER — MED REC SCAN ONLY (OUTPATIENT)
Facility: CLINIC | Age: 54
End: 2024-11-26

## 2024-12-01 NOTE — TELEPHONE ENCOUNTER
Brief telephone note:    Encounter for placement of orders. Please see telephone note for 9/8/2020.     Jose De La Cruz, 09/08/20, 3:20 PM
PAST MEDICAL HISTORY:  Asthma

## 2025-03-06 ENCOUNTER — TELEPHONE (OUTPATIENT)
Dept: INTERNAL MEDICINE CLINIC | Facility: CLINIC | Age: 55
End: 2025-03-06

## 2025-03-10 NOTE — TELEPHONE ENCOUNTER
please see note below, per last office note pt was going on in NOV of 2021 to proceed with repeat CT of neck soft tisse, advised pt to be seen first as will need office notes for prior auth. Pt scheduled for next week in office visit.    Future Appointments   Date Time Provider Department Center   6/23/2023  7:20 AM Hans Montague MD [address] [facility]   7/7/2023  8:15 AM [facility] [address]   1/25/2024  4:00 PM Beulah Oliva MD [facility] Please get labwork completed. This is important since he is on valproic acid and we have to make sure his blood cells and liver are still working ok.    We will keep his seizure medications the same.    Follow up in 6 months.    Please let me know if he has any seizures.

## 2025-04-06 ENCOUNTER — HOSPITAL ENCOUNTER (OUTPATIENT)
Age: 55
Discharge: HOME | End: 2025-04-06
Payer: COMMERCIAL

## 2025-04-06 VITALS
TEMPERATURE: 98 F | HEART RATE: 77 BPM | OXYGEN SATURATION: 98 % | RESPIRATION RATE: 19 BRPM | DIASTOLIC BLOOD PRESSURE: 106 MMHG | SYSTOLIC BLOOD PRESSURE: 153 MMHG

## 2025-04-06 DIAGNOSIS — L70.0 BLACK HEAD: Primary | ICD-10-CM

## 2025-04-06 DIAGNOSIS — I10 ELEVATED BLOOD PRESSURE READING WITH DIAGNOSIS OF HYPERTENSION: ICD-10-CM

## 2025-04-06 PROCEDURE — 99203 OFFICE O/P NEW LOW 30 MIN: CPT | Performed by: NURSE PRACTITIONER

## 2025-04-06 RX ORDER — AMLODIPINE BESYLATE 2.5 MG/1
2.5 TABLET ORAL DAILY
COMMUNITY
Start: 2025-02-11

## 2025-04-06 NOTE — ED INITIAL ASSESSMENT (HPI)
Patient reports \"pimple\" on her back first noticed a couple of months ago. States it seems to be growing slightly and more noticeable. States it is not painful. Possible black head or cyst. Denies any drainage. No fever.

## 2025-04-06 NOTE — DISCHARGE INSTRUCTIONS
Clean the area with soap and water. You may use benzoyl peroxide wash if it is persistent. Warm compresses. Apply antibiotic ointment.   Your blood pressure is elevated. Keep taking your amlodipine. Check your blood pressure daily and keep a journal at home  Follow up with Dr Riley
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
## 2025-04-18 RX ORDER — AMLODIPINE BESYLATE 2.5 MG/1
2.5 TABLET ORAL DAILY
Qty: 30 TABLET | Refills: 1 | Status: SHIPPED
Start: 2025-04-18

## 2025-04-18 NOTE — TELEPHONE ENCOUNTER
Spoke to pt -  pt was not taking her amlodipine for some time and restarted 2 weeks ago;   we discussed the office closing and she will be staying with ;  pt acknowledged that we will be able to fill her med but she will need to be seen at the new office quite promptly as she has not had OV in > 1 year

## 2025-04-21 ENCOUNTER — LAB ENCOUNTER (OUTPATIENT)
Dept: LAB | Facility: HOSPITAL | Age: 55
End: 2025-04-21
Attending: INTERNAL MEDICINE
Payer: COMMERCIAL

## 2025-04-21 DIAGNOSIS — E55.9 AVITAMINOSIS D: ICD-10-CM

## 2025-04-21 DIAGNOSIS — D86.9 SARCOIDOSIS: Primary | ICD-10-CM

## 2025-04-21 DIAGNOSIS — R91.8 LUNG MASS: ICD-10-CM

## 2025-04-21 LAB
ALBUMIN SERPL-MCNC: 4.5 G/DL (ref 3.2–4.8)
ALBUMIN/GLOB SERPL: 1.3 {RATIO} (ref 1–2)
ALP LIVER SERPL-CCNC: 83 U/L (ref 46–118)
ALT SERPL-CCNC: 21 U/L (ref 10–49)
ANION GAP SERPL CALC-SCNC: 7 MMOL/L (ref 0–18)
AST SERPL-CCNC: 28 U/L (ref ?–34)
BASOPHILS # BLD AUTO: 0.03 X10(3) UL (ref 0–0.2)
BASOPHILS NFR BLD AUTO: 0.7 %
BILIRUB SERPL-MCNC: 0.9 MG/DL (ref 0.3–1.2)
BILIRUB UR QL STRIP.AUTO: NEGATIVE
BUN BLD-MCNC: 12 MG/DL (ref 9–23)
CALCIUM BLD-MCNC: 10.3 MG/DL (ref 8.7–10.6)
CHLORIDE SERPL-SCNC: 103 MMOL/L (ref 98–112)
CHOLEST SERPL-MCNC: 181 MG/DL (ref ?–200)
CLARITY UR REFRACT.AUTO: CLEAR
CO2 SERPL-SCNC: 28 MMOL/L (ref 21–32)
COLOR UR AUTO: COLORLESS
CREAT BLD-MCNC: 0.88 MG/DL (ref 0.55–1.02)
CRP SERPL-MCNC: <0.4 MG/DL (ref ?–0.5)
DEPRECATED HBV CORE AB SER IA-ACNC: 101 NG/ML (ref 50–306)
EGFRCR SERPLBLD CKD-EPI 2021: 77 ML/MIN/1.73M2 (ref 60–?)
EOSINOPHIL # BLD AUTO: 0.11 X10(3) UL (ref 0–0.7)
EOSINOPHIL NFR BLD AUTO: 2.6 %
ERYTHROCYTE [DISTWIDTH] IN BLOOD BY AUTOMATED COUNT: 12.4 %
ERYTHROCYTE [SEDIMENTATION RATE] IN BLOOD: 20 MM/HR (ref 0–30)
FASTING STATUS PATIENT QL REPORTED: YES
GLOBULIN PLAS-MCNC: 3.4 G/DL (ref 2–3.5)
GLUCOSE BLD-MCNC: 76 MG/DL (ref 70–99)
GLUCOSE UR STRIP.AUTO-MCNC: NORMAL MG/DL
HCT VFR BLD AUTO: 39.4 % (ref 35–48)
HDLC SERPL-MCNC: 73 MG/DL (ref 40–59)
HGB BLD-MCNC: 13.6 G/DL (ref 12–16)
IGA SERPL-MCNC: 170.4 MG/DL (ref 40–350)
IGM SERPL-MCNC: 94.1 MG/DL (ref 50–300)
IMM GRANULOCYTES # BLD AUTO: 0 X10(3) UL (ref 0–1)
IMM GRANULOCYTES NFR BLD: 0 %
IMMUNOGLOBULIN PNL SER-MCNC: 1920 MG/DL (ref 650–1600)
KETONES UR STRIP.AUTO-MCNC: NEGATIVE MG/DL
LDH SERPL L TO P-CCNC: 157 U/L (ref 120–246)
LDLC SERPL CALC-MCNC: 98 MG/DL (ref ?–100)
LEUKOCYTE ESTERASE UR QL STRIP.AUTO: NEGATIVE
LYMPHOCYTES # BLD AUTO: 2.18 X10(3) UL (ref 1–4)
LYMPHOCYTES NFR BLD AUTO: 51.9 %
MCH RBC QN AUTO: 32.4 PG (ref 26–34)
MCHC RBC AUTO-ENTMCNC: 34.5 G/DL (ref 31–37)
MCV RBC AUTO: 93.8 FL (ref 80–100)
MONOCYTES # BLD AUTO: 0.19 X10(3) UL (ref 0.1–1)
MONOCYTES NFR BLD AUTO: 4.5 %
NEUTROPHILS # BLD AUTO: 1.69 X10 (3) UL (ref 1.5–7.7)
NEUTROPHILS # BLD AUTO: 1.69 X10(3) UL (ref 1.5–7.7)
NEUTROPHILS NFR BLD AUTO: 40.3 %
NITRITE UR QL STRIP.AUTO: NEGATIVE
NONHDLC SERPL-MCNC: 108 MG/DL (ref ?–130)
NT-PROBNP SERPL-MCNC: 332 PG/ML (ref ?–125)
OSMOLALITY SERPL CALC.SUM OF ELEC: 285 MOSM/KG (ref 275–295)
PH UR STRIP.AUTO: 7 [PH] (ref 5–8)
PLATELET # BLD AUTO: 132 10(3)UL (ref 150–450)
PLATELETS.RETICULATED NFR BLD AUTO: 6.5 % (ref 0–7)
POTASSIUM SERPL-SCNC: 3.6 MMOL/L (ref 3.5–5.1)
PROT SERPL-MCNC: 7.9 G/DL (ref 5.7–8.2)
PROT UR STRIP.AUTO-MCNC: NEGATIVE MG/DL
PTH-INTACT SERPL-MCNC: 71.7 PG/ML (ref 18.5–88)
RBC # BLD AUTO: 4.2 X10(6)UL (ref 3.8–5.3)
RBC UR QL AUTO: NEGATIVE
SODIUM SERPL-SCNC: 138 MMOL/L (ref 136–145)
SP GR UR STRIP.AUTO: 1 (ref 1–1.03)
TRIGL SERPL-MCNC: 52 MG/DL (ref 30–149)
TROPONIN I SERPL HS-MCNC: 63 NG/L (ref ?–34)
UROBILINOGEN UR STRIP.AUTO-MCNC: NORMAL MG/DL
VIT D+METAB SERPL-MCNC: 40.6 NG/ML (ref 30–100)
VLDLC SERPL CALC-MCNC: 9 MG/DL (ref 0–30)
WBC # BLD AUTO: 4.2 X10(3) UL (ref 4–11)

## 2025-04-21 PROCEDURE — 86360 T CELL ABSOLUTE COUNT/RATIO: CPT

## 2025-04-21 PROCEDURE — 36415 COLL VENOUS BLD VENIPUNCTURE: CPT

## 2025-04-21 PROCEDURE — 86359 T CELLS TOTAL COUNT: CPT

## 2025-04-21 PROCEDURE — 84484 ASSAY OF TROPONIN QUANT: CPT

## 2025-04-21 PROCEDURE — 86357 NK CELLS TOTAL COUNT: CPT

## 2025-04-21 PROCEDURE — 86140 C-REACTIVE PROTEIN: CPT

## 2025-04-21 PROCEDURE — 82330 ASSAY OF CALCIUM: CPT

## 2025-04-21 PROCEDURE — 82306 VITAMIN D 25 HYDROXY: CPT

## 2025-04-21 PROCEDURE — 85025 COMPLETE CBC W/AUTO DIFF WBC: CPT

## 2025-04-21 PROCEDURE — 80053 COMPREHEN METABOLIC PANEL: CPT

## 2025-04-21 PROCEDURE — 83615 LACTATE (LD) (LDH) ENZYME: CPT

## 2025-04-21 PROCEDURE — 81003 URINALYSIS AUTO W/O SCOPE: CPT

## 2025-04-21 PROCEDURE — 85652 RBC SED RATE AUTOMATED: CPT

## 2025-04-21 PROCEDURE — 86355 B CELLS TOTAL COUNT: CPT

## 2025-04-21 PROCEDURE — 83970 ASSAY OF PARATHORMONE: CPT

## 2025-04-21 PROCEDURE — 83520 IMMUNOASSAY QUANT NOS NONAB: CPT

## 2025-04-21 PROCEDURE — 82652 VIT D 1 25-DIHYDROXY: CPT

## 2025-04-21 PROCEDURE — 83880 ASSAY OF NATRIURETIC PEPTIDE: CPT

## 2025-04-21 PROCEDURE — 82728 ASSAY OF FERRITIN: CPT

## 2025-04-21 PROCEDURE — 82164 ANGIOTENSIN I ENZYME TEST: CPT

## 2025-04-21 PROCEDURE — 82784 ASSAY IGA/IGD/IGG/IGM EACH: CPT

## 2025-04-21 PROCEDURE — 80061 LIPID PANEL: CPT

## 2025-04-22 ENCOUNTER — HOSPITAL ENCOUNTER (OUTPATIENT)
Dept: GENERAL RADIOLOGY | Facility: HOSPITAL | Age: 55
Discharge: HOME | End: 2025-04-22
Payer: COMMERCIAL

## 2025-04-22 ENCOUNTER — EKG ENCOUNTER (OUTPATIENT)
Dept: LAB | Facility: HOSPITAL | Age: 55
End: 2025-04-22
Payer: COMMERCIAL

## 2025-04-22 ENCOUNTER — LAB ENCOUNTER (OUTPATIENT)
Dept: LAB | Facility: HOSPITAL | Age: 55
End: 2025-04-22
Payer: COMMERCIAL

## 2025-04-22 DIAGNOSIS — D86.9 SARCOIDOSIS: ICD-10-CM

## 2025-04-22 DIAGNOSIS — R79.89 ELEVATED TROPONIN: ICD-10-CM

## 2025-04-22 DIAGNOSIS — R79.89 ELEVATED TROPONIN: Primary | ICD-10-CM

## 2025-04-22 LAB
ACE: 84 U/L
CD19 CELLS NFR SPEC: 12 % (ref 7–20)
CD3 CELLS NFR SPEC: 66 % (ref 64–84)
CD3+CD4+ CELLS # BLD: 1086 /MM3 (ref 519–1445)
CD3+CD4+ CELLS NFR BLD: 50 % (ref 34–59)
CD3+CD4+ CELLS/CD3+CD8+ CLL SPEC: 3.6 (ref 1.1–3.3)
CD3+CD8+ CELLS NFR SPEC: 14 % (ref 13–39)
CD3-CD16+CD56+ CELLS NFR SPEC: 21 % (ref 2–15)
LC CALCIUM, IONIZED: 5.4 MG/DL
TROPONIN I SERPL HS-MCNC: 60 NG/L (ref ?–34)

## 2025-04-22 PROCEDURE — 84484 ASSAY OF TROPONIN QUANT: CPT

## 2025-04-22 PROCEDURE — 93005 ELECTROCARDIOGRAM TRACING: CPT

## 2025-04-22 PROCEDURE — 93010 ELECTROCARDIOGRAM REPORT: CPT | Performed by: INTERNAL MEDICINE

## 2025-04-22 PROCEDURE — 36415 COLL VENOUS BLD VENIPUNCTURE: CPT

## 2025-04-22 PROCEDURE — 71046 X-RAY EXAM CHEST 2 VIEWS: CPT

## 2025-04-24 LAB — VIT D 1,25 DI-OH: 85.9 PG/ML

## 2025-04-25 LAB
ATRIAL RATE: 71 BPM
P AXIS: 76 DEGREES
P-R INTERVAL: 154 MS
Q-T INTERVAL: 394 MS
QRS DURATION: 86 MS
QTC CALCULATION (BEZET): 428 MS
R AXIS: 60 DEGREES
T AXIS: 48 DEGREES
VENTRICULAR RATE: 71 BPM

## 2025-04-29 LAB — Lab: <31.2 PG/ML

## 2025-04-29 NOTE — TELEPHONE ENCOUNTER
Siri Vyas is here for:     Chief Complaint   Patient presents with    RECHECK     2 week S/p Right ulnar nerve decompression at the elbow DOS: 4/16/25        Pain: 3-4/10  Taking the following medications from surgery: Tylenol PRN  Needs refill on: Nothing  Physical therapy clinic: Nothing      Arpita Teran, ATC     To Dr. Zoya Fischer - see below - please review above pended referral.

## 2025-05-27 ENCOUNTER — OFFICE VISIT (OUTPATIENT)
Dept: INTERNAL MEDICINE CLINIC | Facility: CLINIC | Age: 55
End: 2025-05-27

## 2025-05-27 VITALS
SYSTOLIC BLOOD PRESSURE: 118 MMHG | OXYGEN SATURATION: 98 % | HEART RATE: 90 BPM | BODY MASS INDEX: 22.13 KG/M2 | DIASTOLIC BLOOD PRESSURE: 86 MMHG | TEMPERATURE: 98 F | WEIGHT: 141 LBS | HEIGHT: 67 IN | RESPIRATION RATE: 16 BRPM

## 2025-05-27 DIAGNOSIS — R92.30 DENSE BREAST: ICD-10-CM

## 2025-05-27 DIAGNOSIS — R79.89 ELEVATED TROPONIN: ICD-10-CM

## 2025-05-27 DIAGNOSIS — D35.2 PITUITARY ADENOMA (HCC): ICD-10-CM

## 2025-05-27 DIAGNOSIS — J06.9 VIRAL UPPER RESPIRATORY TRACT INFECTION: Primary | ICD-10-CM

## 2025-05-27 DIAGNOSIS — H46.9 OPTIC NEUROPATHY, LEFT: ICD-10-CM

## 2025-05-27 DIAGNOSIS — H40.003 GLAUCOMA SUSPECT OF BOTH EYES: ICD-10-CM

## 2025-05-27 DIAGNOSIS — Z12.31 ENCOUNTER FOR SCREENING MAMMOGRAM FOR MALIGNANT NEOPLASM OF BREAST: ICD-10-CM

## 2025-05-27 PROCEDURE — 3074F SYST BP LT 130 MM HG: CPT | Performed by: INTERNAL MEDICINE

## 2025-05-27 PROCEDURE — G2211 COMPLEX E/M VISIT ADD ON: HCPCS | Performed by: INTERNAL MEDICINE

## 2025-05-27 PROCEDURE — 99214 OFFICE O/P EST MOD 30 MIN: CPT | Performed by: INTERNAL MEDICINE

## 2025-05-27 PROCEDURE — 3079F DIAST BP 80-89 MM HG: CPT | Performed by: INTERNAL MEDICINE

## 2025-05-27 PROCEDURE — 3008F BODY MASS INDEX DOCD: CPT | Performed by: INTERNAL MEDICINE

## 2025-05-27 RX ORDER — CLINDAMYCIN HYDROCHLORIDE 300 MG/1
300 CAPSULE ORAL AS DIRECTED
COMMUNITY
Start: 2025-02-13

## 2025-05-27 RX ORDER — LATANOPROST 50 UG/ML
1 SOLUTION/ DROPS OPHTHALMIC EVERY EVENING
COMMUNITY
Start: 2025-04-14

## 2025-05-27 RX ORDER — ACETAMINOPHEN 325 MG/1
2 TABLET ORAL EVERY 6 HOURS PRN
COMMUNITY
Start: 2024-12-14
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
## 2025-06-17 ENCOUNTER — HOSPITAL ENCOUNTER (OUTPATIENT)
Dept: NUCLEAR MEDICINE | Facility: HOSPITAL | Age: 55
Discharge: HOME | End: 2025-06-17
Attending: INTERNAL MEDICINE
Payer: COMMERCIAL

## 2025-06-17 DIAGNOSIS — R91.8 LUNG NODULES: ICD-10-CM

## 2025-06-17 LAB — GLUCOSE BLD-MCNC: 59 MG/DL (ref 70–99)

## 2025-06-17 PROCEDURE — 82962 GLUCOSE BLOOD TEST: CPT

## 2025-06-17 PROCEDURE — 78815 PET IMAGE W/CT SKULL-THIGH: CPT | Performed by: INTERNAL MEDICINE

## 2025-06-18 ENCOUNTER — TELEPHONE (OUTPATIENT)
Facility: CLINIC | Age: 55
End: 2025-06-18

## 2025-06-18 ENCOUNTER — TELEPHONE (OUTPATIENT)
Dept: INTERNAL MEDICINE CLINIC | Facility: CLINIC | Age: 55
End: 2025-06-18

## 2025-07-15 ENCOUNTER — OFFICE VISIT (OUTPATIENT)
Dept: RHEUMATOLOGY | Facility: CLINIC | Age: 55
End: 2025-07-15
Payer: COMMERCIAL

## 2025-07-15 VITALS
OXYGEN SATURATION: 99 % | SYSTOLIC BLOOD PRESSURE: 130 MMHG | HEIGHT: 67 IN | DIASTOLIC BLOOD PRESSURE: 78 MMHG | TEMPERATURE: 97 F | HEART RATE: 78 BPM | RESPIRATION RATE: 16 BRPM | WEIGHT: 140 LBS | BODY MASS INDEX: 21.97 KG/M2

## 2025-07-15 DIAGNOSIS — Z51.81 THERAPEUTIC DRUG MONITORING: ICD-10-CM

## 2025-07-15 DIAGNOSIS — B36.9 CUTANEOUS FUNGAL INFECTION: ICD-10-CM

## 2025-07-15 DIAGNOSIS — G47.33 OSA (OBSTRUCTIVE SLEEP APNEA): ICD-10-CM

## 2025-07-15 DIAGNOSIS — E67.3 HYPERVITAMINOSIS D: ICD-10-CM

## 2025-07-15 DIAGNOSIS — G62.9 NEUROPATHY: ICD-10-CM

## 2025-07-15 DIAGNOSIS — D86.9 SARCOIDOSIS: Primary | ICD-10-CM

## 2025-07-15 DIAGNOSIS — L92.9 NON-CASEATING GRANULOMA: ICD-10-CM

## 2025-07-15 PROCEDURE — 3008F BODY MASS INDEX DOCD: CPT | Performed by: INTERNAL MEDICINE

## 2025-07-15 PROCEDURE — 99214 OFFICE O/P EST MOD 30 MIN: CPT | Performed by: INTERNAL MEDICINE

## 2025-07-15 PROCEDURE — G2211 COMPLEX E/M VISIT ADD ON: HCPCS | Performed by: INTERNAL MEDICINE

## 2025-07-15 PROCEDURE — 3078F DIAST BP <80 MM HG: CPT | Performed by: INTERNAL MEDICINE

## 2025-07-15 PROCEDURE — 3075F SYST BP GE 130 - 139MM HG: CPT | Performed by: INTERNAL MEDICINE

## 2025-07-15 RX ORDER — KETOCONAZOLE 20 MG/G
1 CREAM TOPICAL 2 TIMES DAILY
Qty: 15 G | Refills: 0 | Status: SHIPPED | OUTPATIENT
Start: 2025-07-15

## 2025-07-15 NOTE — H&P
The following individual(s) verbally consented to be recorded using ambient AI listening technology and understand that they can each withdraw their consent to this listening technology at any point by asking the clinician to turn off or pause the recording:    Patient name: Purvi MARSHALL Chiroslyn  Additional names:

## 2025-07-15 NOTE — PROGRESS NOTES
Rheumatology f/u Patient Note  =====================================================================================================    Chief complaint: Noncaseating granulomas  Chief Complaint   Patient presents with    Follow - Up     LOV 9/20/2024   Rapid 3 score is 0.7  Patient states she feels about the same.     PCP  Brenda Riley DO  Fax: [phone]  Phone: [phone]  =====================================================================================================  HPI    Date of visit: 2/14/2024  Purvi Sidhu is a 55 year old female   Here for evaluation of sarcoidosis.  -3-4 years back, developed significant hand stiffness/pain. -previously doublejointed, poping joints frequently, stopped this in 2021.   -Nov 2023: Chest X-ray completed. Then CT Chest completed with scattered pulmonary nodules and mediastinal lymphadenopathy, s/p EBUS with TBBx demonstrated non-caseating granulomas  -saw ophtho: no ocular sarcoid is noted.  Recently diagnosed with optic neuropathy due to suspected glaucoma.  -had tingling in fingers/toes, went to ER. Significant anxiety is noted.  No numbness and tingling currently.  -troponin was elevated. Saw cardiology in hospital, recommended MRI cardiac stress wwo.  To workup cardiac sarcoid.  -walking 2 miles per day.   -lost 10 lbs unintentionally.  Lots of stress  -travel internationally and domestically. Including [place], [place].  Has been to [place], [place] in the past.  -working as  specialist.   -Hx of hyperparathyroidism, s/p resection.  -Pituitary adenoma history, follows with endocrinology.  Fhx:  2 1st cousins with SLE    Stepfather with pancreatic cancer  Mother with lung cancer  ==============================================================================================================  Visit: 09/20/24  Doing well. Cardiac MRI wwo wnl. Saw Dr. Navarro (sarcoid specialist), no significant extra-pulmonary sarcoid seen. No need for  immunomodulators  -still with tingling in the hands, mostly in the left fingers  -decreased vitamin D supplement given hypercalcemia per endo  -nocturnal hypoxia to high 70s in the evening, snoring is noted.   ==============================================================================================================  Today's Visit: 07/15/25    She is currently asymptomatic regarding her sarcoidosis and continues her usual activities without difficulty. Late in 2024, a CT scan showed growth in one of her nodules, leading to a bronchoscopy which confirmed sarcoidosis and not cancer. The procedure resulted in a pneumothorax, causing significant pain due to chest tube placement and a two-day hospital stay. She describes the chest tube experience as 'the most pain I've felt in my life.'    She recently saw an ophthalmologist yesterday who confirmed no uveitis in her eyes.  Saw Dr. De Leon, A PET scan was recently just performed, still waiting on interpretation by ordering physician.    She was diagnosed with sleep apnea but has not followed up with consulting sleep physician for treatment.    She mentions a persistent rash, described as a small bump present for two years, which occasionally itches. She has been using cortisol cream for it. The rash has not changed in size or appearance.    Her fingers remain stiff since 2022, with no pain upon pressing, and she has stopped popping her fingers.  Did not complete EMG/NCS that was previously ordered.    5 point ROS negative except noted above  I had reviewed past medical and family histories together with allergy and medication lists documented.      Medications:  Current Outpatient Medications on File Prior to Visit   Medication Sig Dispense Refill    latanoprost 0.005 % Ophthalmic Solution Apply 1 drop to eye every evening.      amLODIPine 2.5 MG Oral Tab Take 1 tablet (2.5 mg total) by mouth daily.      cabergoline 0.5 MG Oral Tab Take 0.5 tablets (0.25 mg total) by  mouth once a week.      ERGOCALCIFEROL 1.25 MG (21345 UT) Oral Cap TAKE ONE WEEKLY FOR 12 WEEKS. (Patient taking differently: 1 capsule (50,000 Units total) As Directed. Every 2 weeks) 12 capsule 0    Multiple Vitamins-Minerals (WOMENS MULTIVITAMIN PLUS) Oral Tab Take 1 tablet by mouth daily.       No current facility-administered medications on file prior to visit.   Allergies:  Allergies   Allergen Reactions    Amoxicillin HIVES    Penicillins HIVES         Objective  Vitals:    07/15/25 1446   BP: 130/78   Pulse: 78   Resp: 16   Temp: 96.9 °F (36.1 °C)   SpO2: 99%   Weight: 140 lb (63.5 kg)   Height: 5' 7\" (1.702 m)     GEN: NAD, well-nourished.   HEENT: Head: NCAT. Face: No lesions. Eyes: Conjunctiva clear. Sclera are anicteric. PERRLA. EOMs are full.   CV: RRR, no mrg, S1/S2  PULM: Prolonged expiratory phase  Extremities: No cyanosis, edema or deformities.   Neurologic: Strength, CN2-12 grossly intact   Psych: normal affect.   Skin: No lesions or rashes.  MSK: 28 joint count performed. No evidence of synovitis in mcp, pip, dip, wrist, elbows, shoulders, hips, knees, ankles, mtp unless otherwise noted. Full ROM of elbows, wrists, knees.     Labs:    4/2025  1, 25 OH vitamin D 85.9 which is elevated  Sed rate WNL, CRP undetectable  CMP, CBC W differential WNL    Lab Results   Component Value Date    VITD 40.6 04/21/2025    PTH 71.7 04/21/2025 2/2024  Urine histo negative Quant gold negative  SPEP WNL  Serum crypto negative  Cocci CF negative, cocci IgG borderline  Cocci IgG DVTP negative  Serum histo antibody negative  Blasto negative  Vitamin D WNL  1, 25 dihydroxy vitamin D 93.0, upper limit of normal 81.5  B12, folate negative  Ferritin WNL  TSH WNL  Vitamin D63.7  Hepatitis B surface antibody negative  HCV neg    1/2024  TREE, RF negative  ACE neg  MPO/PR3    1/2024 TBBx Mediastinal lymph node   Non-caseating granulomas.  No organisms seen with AFB and GMS stains.  No malignant cells seen.    Lab Results    Component Value Date    ESRML 20 04/21/2025    ESRML 27 11/12/2024    ESRML 6 02/09/2024    ESRML 16 01/18/2024    ESRML 14 01/07/2021    CRP <0.40 04/21/2025    CRP <0.40 11/12/2024    CRP <0.29 02/09/2024    CRP <0.29 01/07/2021        Lab Results   Component Value Date    WBC 4.2 04/21/2025    RBC 4.20 04/21/2025    HGB 13.6 04/21/2025    HCT 39.4 04/21/2025    .0 (L) 04/21/2025    MPV 9.1 05/26/2018    MCV 93.8 04/21/2025    MCH 32.4 04/21/2025    MCHC 34.5 04/21/2025    RDW 12.4 04/21/2025    NEPRELIM 1.69 04/21/2025    NEUT 4.6 05/12/2016    LYMPH 1.9 05/12/2016    MON 0.4 05/12/2016    EOS 0.4 05/12/2016    BASO 0.1 05/12/2016    NEPERCENT 40.3 04/21/2025    LYPERCENT 51.9 04/21/2025    MOPERCENT 4.5 04/21/2025    EOPERCENT 2.6 04/21/2025    BAPERCENT 0.7 04/21/2025    NE 1.69 04/21/2025    LYMABS 2.18 04/21/2025    MOABSO 0.19 04/21/2025    EOABSO 0.11 04/21/2025    BAABSO 0.03 04/21/2025     Lab Results   Component Value Date    GLU 76 04/21/2025    BUN 12 04/21/2025    BUNCREA 11.6 01/24/2024    CREATSERUM 0.88 04/21/2025    ANIONGAP 7 04/21/2025    GFRNAA 75 02/25/2021    GFRAA 86 02/25/2021    CA 10.3 04/21/2025    OSMOCALC 285 04/21/2025    ALKPHO 83 04/21/2025    AST 28 04/21/2025    ALT 21 04/21/2025    ALKPHOS 67 04/21/2016    BILT 0.9 04/21/2025    TP 7.9 04/21/2025    ALB 4.5 04/21/2025    GLOBULIN 3.4 04/21/2025    AGRATIO 1.1 04/21/2016     04/21/2025    K 3.6 04/21/2025     04/21/2025    CO2 28.0 04/21/2025         Lab Results   Component Value Date    ANASCRN Positive (A) 01/07/2021     No results found for: \"SSA\", \"SSAUR\", \"ANTISSA\", \"SSA52\", \"SSA60\", \"SSADD\", \"SSB\", \"ANTISSB\"  Lab Results   Component Value Date    DSDNA <10 04/05/2021    SMITHRNP Negative 04/05/2021     No results found for: \"SCL70\", \"SCL\", \"RZJYCQJ77\"  Lab Results   Component Value Date    C3 85.8 (L) 02/09/2021    C4 27.8 02/09/2021     No results found for: \"DRVVT\", \"LAINT\", \"PTTLUPUS\", \"LUPUSINTERP\",  \"LA\", \"V3LJ4JFHMX\", \"I8PW3PZHKU\", \"H6APYSOVVQ\", \"N0TLFYSFZW\"  No results found for: \"CARDIOLIPIGG\", \"CARDIOLIPIGM\", \"CARDIOLIPIGA\", \"CARDIOIGA\", \"CARLIP\"      Additional Labs:    Radiology:  PET STANDARD BODY SCAN (ONCOLOGY) (CPT=78815)  Result Date: 6/17/2025  CONCLUSION:  Hilar and mediastinal adenopathy with uptake.  Deauville category 4 with hilar and mediastinal uptake, with the greatest uptake in the right hilum SUV maximum 4.8 , and hepatic background activity 2.5.  The pulmonary nodules show considerably less uptake, at or below 1.0 SUV maximum.  Indeterminate etiology skeletal uptake involving right clavicle, right humerus, left humerus.  Deauville Score PET/CT Scan 1. No uptake above background. 2. Uptake at an initial site that is less than or equal to mediastinum. 3. Uptake at an initial site that is greater than mediastinum but less than or equal to liver. 4. Uptake at an initial site that is moderately increased compared to the liver at any site. 5a. Uptake at an initial site that is markedly increased compared to the liver. 5b. Uptake markedly increased compared to the liver at any new site that is possibly related to lymphoma. X New areas of uptake unlikely related to lymphoma.   LOCATION:  [facility]   Dictated by ([facility]): Shukri Luu MD on 6/17/2025 at 4:05 PM     Finalized by (CST): Shukri Luu MD on 6/17/2025 at 4:21 PM       XR CHEST PA + LAT CHEST (NOS=[id number])  Result Date: 4/22/2025  CONCLUSION:  No active cardiopulmonary process identified.   LOCATION:  [facility]   Dictated by (CST): Jai Lawrence MD on 4/22/2025 at 9:40 AM     Finalized by (CST): Jai Lawrence MD on 4/22/2025 at 9:41 AM           Radiology review:      =====================================================================================================  Assessment and Plan  Assessment:  1. Sarcoidosis    2. Cutaneous fungal infection    3. Neuropathy    4. Hypervitaminosis D    5. ALEX (obstructive sleep apnea)    6.  Non-caseating granuloma    7. Therapeutic drug monitoring      #pulmonary sarcoid: Pulmonary nodules, mediastinal lymphadenopathy. Biopsy proven noncaseating granulomas from mediastinal lymph nodes in 1/2024  -Elevated troponin noted, cardiac MRI stress normal.   -MRI pituitary did not confirm sarcoid pituitary.   -Repeat CT of the chest from 3/2024 with stable pulmonary nodules.  -No uveitis seen in 2025 ophthalmology examination  - 6/2025 PET scan ordered by sarcoidosis specialist (Dr. De Leon) demonstrating hilar/mediastinal lymphadenopathy with uptake.  Pulmonary nodules with lesser uptake.  She is already  -No definitive extra pulmonary sarcoidosis appreciated at this juncture.    #ALEX  - The cause of the patient's nocturnal desaturations to the high 70s  - Not seen sleep medicine as recommended previously    #Bilateral optic cupping: Noted on last ophthalmology examination.  #Pituitary adenoma: On cabergoline.  Stable, sees endo.   #Intermittent neuropathy: None currently.  Peripheral in nature  #Arthralgia: PIP discomfort without any tender or swollen joints in these regions today.  Suspect due to longstanding hypermobility vs neuropathic issue  #Nocturnal hypoxia: Down to the high 70s.  PSG 15 years ago normal.  Snoring noted    Plan:  - Recommend seeing sleep medicine, referral again placed today.  Patient has had many appointments in the recent past.  Would like to limit any sort of diagnostic and appointments to essential wands  - Repeat 1, 25 dihydroxy vitamin D given this was elevated in the past  - Previously ordered EMG/NCS but the patient would like to defer this for now  -Currently no indication for immunomodulatory therapy.   - Continue to follow-up with sarcoid specialists  -Ketoconazole topical for possible tinea.  No rash overlying localized area of pruritus.  - RTC in 1 year with me.    Diagnoses and all orders for this visit:    Sarcoidosis    Cutaneous fungal infection  -     ketoconazole 2 %  External Cream; Apply 1 Application topically 2 (two) times daily.    Neuropathy    Hypervitaminosis D    ALEX (obstructive sleep apnea)  -     Sleep Medicine - [facility]    Non-caseating granuloma    Therapeutic drug monitoring            No follow-ups on file.    The above plan of care, diagnosis, orders, and follow-up were discussed with the patient. Questions related to this recommended plan of care were answered.    Thank you for referring this delightful patient to me. Please feel free to contact me with any questions.     This report was performed utilizing speech recognition software technology. Despite proofreading, speech recognition errors could escape detection. If a word or phrase is confusing or out of context, please do not hesitate to call for   clarification.       Kind regards      Reji Cagle MD  [facility]

## 2025-07-16 RX ORDER — AMLODIPINE BESYLATE 2.5 MG/1
2.5 TABLET ORAL DAILY
Qty: 90 TABLET | Refills: 0 | Status: SHIPPED | OUTPATIENT
Start: 2025-07-16

## 2025-07-16 NOTE — TELEPHONE ENCOUNTER
Refill passed per [facility] protocols.    07/16/25  Former [facility] patient. Courtesy refill given.     Requested Prescriptions   Pending Prescriptions Disp Refills    amLODIPine 2.5 MG Oral Tab 90 tablet 0     Sig: Take 1 tablet (2.5 mg total) by mouth daily.       Hypertension Medications Protocol Passed - 7/16/2025  3:24 PM

## 2025-07-16 NOTE — TELEPHONE ENCOUNTER
Received faxed refill request for below Medication from [id number]  Amlodipine Besylate 2.5mg Tab  Qty #90  Take one tablet by mouth every day    Patient has been reassigned to   due to Dr. Riley's practice closing

## 2025-08-03 SDOH — ECONOMIC STABILITY: FOOD INSECURITY: WITHIN THE PAST 12 MONTHS, THE FOOD YOU BOUGHT JUST DIDN'T LAST AND YOU DIDN'T HAVE MONEY TO GET MORE.: NEVER TRUE

## 2025-08-03 SDOH — ECONOMIC STABILITY: TRANSPORTATION INSECURITY
IN THE PAST 12 MONTHS, HAS LACK OF RELIABLE TRANSPORTATION KEPT YOU FROM MEDICAL APPOINTMENTS, MEETINGS, WORK OR FROM GETTING THINGS NEEDED FOR DAILY LIVING?: NO

## 2025-08-03 SDOH — ECONOMIC STABILITY: HOUSING INSECURITY: DO YOU HAVE PROBLEMS WITH ANY OF THE FOLLOWING?: NONE OF THE ABOVE

## 2025-08-03 SDOH — ECONOMIC STABILITY: HOUSING INSECURITY: WHAT IS YOUR LIVING SITUATION TODAY?: I HAVE A STEADY PLACE TO LIVE

## 2025-08-03 ASSESSMENT — SOCIAL DETERMINANTS OF HEALTH (SDOH): IN THE PAST 12 MONTHS, HAS THE ELECTRIC, GAS, OIL, OR WATER COMPANY THREATENED TO SHUT OFF SERVICE IN YOUR HOME?: NO

## 2025-08-04 ENCOUNTER — APPOINTMENT (OUTPATIENT)
Dept: OBGYN | Age: 55
End: 2025-08-04

## 2025-08-04 VITALS — BODY MASS INDEX: 22.21 KG/M2 | WEIGHT: 141.5 LBS | HEIGHT: 67 IN

## 2025-08-04 DIAGNOSIS — Z01.419 ROUTINE GYNECOLOGICAL EXAMINATION: Primary | ICD-10-CM

## 2025-08-04 DIAGNOSIS — Z12.31 ENCOUNTER FOR SCREENING MAMMOGRAM FOR MALIGNANT NEOPLASM OF BREAST: ICD-10-CM

## 2025-08-04 PROCEDURE — 99396 PREV VISIT EST AGE 40-64: CPT | Performed by: OBSTETRICS & GYNECOLOGY

## 2025-08-04 RX ORDER — PREDNISONE 5 MG/1
5 TABLET ORAL 2 TIMES DAILY
COMMUNITY
Start: 2025-05-02 | End: 2025-08-04 | Stop reason: ALTCHOICE

## 2025-08-04 RX ORDER — TIMOLOL MALEATE 5 MG/ML
SOLUTION/ DROPS OPHTHALMIC
COMMUNITY
Start: 2025-07-14 | End: 2025-08-04 | Stop reason: ALTCHOICE

## 2025-08-04 RX ORDER — AMLODIPINE BESYLATE 2.5 MG/1
2.5 TABLET ORAL
COMMUNITY
Start: 2025-02-11 | End: 2025-08-04 | Stop reason: ALTCHOICE

## 2025-08-04 RX ORDER — KETOCONAZOLE 20 MG/G
1 CREAM TOPICAL 2 TIMES DAILY
COMMUNITY
Start: 2025-07-15

## 2025-08-04 RX ORDER — LATANOPROST 50 UG/ML
1 SOLUTION/ DROPS OPHTHALMIC
COMMUNITY
Start: 2025-04-14

## 2025-08-04 RX ORDER — DOXYCYCLINE 100 MG/1
CAPSULE ORAL
COMMUNITY
Start: 2025-04-18 | End: 2025-08-04 | Stop reason: ALTCHOICE

## 2025-08-04 ASSESSMENT — PATIENT HEALTH QUESTIONNAIRE - PHQ9
CLINICAL INTERPRETATION OF PHQ2 SCORE: NO FURTHER SCREENING NEEDED
2. FEELING DOWN, DEPRESSED OR HOPELESS: NOT AT ALL
SUM OF ALL RESPONSES TO PHQ9 QUESTIONS 1 AND 2: 0
SUM OF ALL RESPONSES TO PHQ9 QUESTIONS 1 AND 2: 0
1. LITTLE INTEREST OR PLEASURE IN DOING THINGS: NOT AT ALL

## 2025-08-04 ASSESSMENT — ENCOUNTER SYMPTOMS
CONSTITUTIONAL NEGATIVE: 1
ALLERGIC/IMMUNOLOGIC COMMENTS: PCN
GASTROINTESTINAL NEGATIVE: 1

## 2025-08-04 ASSESSMENT — ANXIETY QUESTIONNAIRES
2. NOT BEING ABLE TO STOP OR CONTROL WORRYING: NOT AT ALL
GAD7 TOTAL SCORE: 4
1. FEELING NERVOUS, ANXIOUS, OR ON EDGE: SEVERAL DAYS
4. TROUBLE RELAXING: NEARLY EVERY DAY
3. WORRYING TOO MUCH ABOUT DIFFERENT THINGS: 0
7. FEELING AFRAID AS IF SOMETHING AWFUL MIGHT HAPPEN: NOT AT ALL
4. TROUBLE RELAXING: 3
5. BEING SO RESTLESS THAT IT IS HARD TO SIT STILL: 0
1. FEELING NERVOUS, ANXIOUS, OR ON EDGE: 1
3. WORRYING TOO MUCH ABOUT DIFFERENT THINGS: NOT AT ALL
2. NOT BEING ABLE TO STOP OR CONTROL WORRYING: 0
7. FEELING AFRAID AS IF SOMETHING AWFUL MIGHT HAPPEN: 0
6. BECOMING EASILY ANNOYED OR IRRITABLE: 0
5. BEING SO RESTLESS THAT IT IS HARD TO SIT STILL: NOT AT ALL
6. BECOMING EASILY ANNOYED OR IRRITABLE: NOT AT ALL

## 2025-08-23 ENCOUNTER — HOSPITAL ENCOUNTER (OUTPATIENT)
Dept: MAMMOGRAPHY | Age: 55
Discharge: HOME | End: 2025-08-23
Attending: INTERNAL MEDICINE

## 2025-08-23 DIAGNOSIS — R92.30 DENSE BREAST: ICD-10-CM

## 2025-08-23 DIAGNOSIS — Z12.31 ENCOUNTER FOR SCREENING MAMMOGRAM FOR MALIGNANT NEOPLASM OF BREAST: ICD-10-CM

## 2025-08-23 PROCEDURE — 77067 SCR MAMMO BI INCL CAD: CPT | Performed by: INTERNAL MEDICINE

## 2025-08-23 PROCEDURE — 77063 BREAST TOMOSYNTHESIS BI: CPT | Performed by: INTERNAL MEDICINE

## (undated) DEVICE — KENDALL SCD EXPRESS SLEEVES, KNEE LENGTH, MEDIUM: Brand: KENDALL SCD

## (undated) DEVICE — Device

## (undated) DEVICE — NITINOL WIRE STR 035

## (undated) DEVICE — OPEN-END FLEXI-TIP URETERAL CATHETER: Brand: FLEXI-TIP

## (undated) DEVICE — CYSTO CDS-LF: Brand: MEDLINE INDUSTRIES, INC.

## (undated) DEVICE — SOL H2O 1000ML BTL

## (undated) DEVICE — GLOVE SURG SENSICARE SZ 7
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
## (undated) NOTE — LETTER
January 4, 2018    [facility]     Patient: Paulie Rapp   YOB: 1970   Date of Visit: 1/4/2018       Dear Dr. Alejandra Rodríguez, DO:    Thank you for referring Corazon Vicente to me for evaluation.  Her • Macular degeneration Neg    • Glaucoma Neg    • Diabetes Neg        Social History: Smoking status: Never Smoker                                                              Smokeless tobacco: Never Used                      Alcohol use:  No Right Left    Disc Good rim, Temporal crescent Sloping margin, Temporal crescent, pale disc     C/D Ratio 0.4 0.9    Macula Normal RPE mottling     Vessels Normal Normal    Periphery Normal Normal            Refraction     Wearing Rx       Sphere Cylind

## (undated) NOTE — MR AVS SNAPSHOT
[facility] [place]  [facility] [address] [address] [phone]               Thank you for choosing us for your health care visit with Faby Contreras DO. We are glad to serve you and happy to provide you with this summary of your visit.   Please he authorization numbers or be assured that none are required. You can then schedule your appointment. Failure to obtain required authorization numbers can create reimbursement difficulties for you.     Assoc Dx:  Vision loss [H54.7]          Reason for Today' For medical emergencies, dial 911. Educational Information     Your blood pressure indicates you may be at-risk for Hypertension. Please consider the following Lifestyle Modifications.   Also, please return for a follow-up Blood Pressure Check in

## (undated) NOTE — LETTER
[place], [address]       04/08/21        Patient: Hnug Rene   YOB: 1970   Date of Visit: 4/8/2021       Dear  Dr. Aure Virgen MD,      Thank you for referring Hung Rene to my practice.   Alexis

## (undated) NOTE — LETTER
21      Patient: Mandi Spaulding  : 3/18/1970 Visit date: 2021    Dear Reymundo Fabry,      I examined your patient in consultation today. She has an asymptomatic A1 ganglion of the left middle finger.   Nothing need be done for this

## (undated) NOTE — MR AVS SNAPSHOT
[facility] [place] [id number] [facility] [address] [phone]               Thank you for choosing us for your health care visit with Jose G Rodriguez PA-C.   We are glad to serve you and happy to provide you with this summary of y WOMENS MULTIVITAMIN PLUS Tabs   Take  by mouth daily. MyChart     Visit [employer]  You can access your MyChart to more actively manage your health care and view more details from this visit by going to [email or web address].   If you

## (undated) NOTE — LETTER
December 18, 2018    [place], [place]  [address]     Patient: Chapis Christopher   YOB: 1970   Date of Visit: 12/18/2018       Dear Dr. Alana Mariee, DO:    Thank you for referring Josefa Bergman to me for evaluation.  Here is • Macular degeneration Neg    • Glaucoma Neg    • Diabetes Neg        Social History: Social History    Tobacco Use      Smoking status: Never Smoker      Smokeless tobacco: Never Used    Alcohol use: No    Drug use: No      Medications:    Current Outpati Disc Good rim, Temporal crescent Sloping margin, Temporal crescent, pale disc     C/D Ratio 0.4 0.9    Macula Normal RPE mottling     Vessels Normal Normal    Periphery Normal Normal            Refraction     Wearing Rx       Sphere Cylinder Axis    Right If you have questions, please do not hesitate to call me. I look forward to following Magen Allen along with you.     Sincerely,        Zeinab Garcia MD        CC: No Recipients    Document electronically generated by: Zeinab Garcia

## (undated) NOTE — Clinical Note
March 7, 2017    [facility] [place]     Patient: Lamont Duong   YOB: 1970   Date of Visit: 3/7/2017       Dear Dr. Eleno Eaton, DO:    Thank you for referring Jessica Benson to me for evaluation.  Here Alcohol Use: No                Medications:    Current Outpatient Prescriptions:  Fluticasone Propionate 50 MCG/ACT Nasal Suspension 2 sprays by Each Nare route daily.  Disp: 3 Bottle Rfl: 3   Multiple Vitamins-Minerals (WOMENS MULTIVITAMIN PLUS) Oral Tab only. 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

## (undated) NOTE — MR AVS SNAPSHOT
Ricardo  [address]  [phone]               Thank you for choosing us for your health care visit with Yanelis Connell MD.  We are glad to serve you and happy to provide you with this summa Pituitary adenoma [facility])  Patient is under the care of Dr. Clarance Hatchet and Dr. Jorge Luis Ayala. She has not had an MRI since 2014. Recommend repeat MRI in the near future. Visual field completed with stable results that were discussed with patient in office.     Roberto Slater

## (undated) NOTE — Clinical Note
TCM call completed. A TCM-HFU appointment is scheduled for 2/16/2024. The patient has not been taking Rx amlodipine at home due to hypotensive home readings. Contacted cardiology to assist with testing and appointments. Thank you.

## (undated) NOTE — LETTER
[facility]  [id number] [facility], [address] [place] [address]    Consent for Operation    Date: __________________    Time: _______________    1.  I authorize the performance upon Prasad Flores the following operation:    Procedure(s):  [facility] procedure has been videotaped, the surgeon will obtain the original videotape. The hospital will not be responsible for storage or maintenance of this tape.     6. For the purpose of advancing medical education, I consent to the admittance of observers to t STATEMENTS REQUIRING INSERTION OR COMPLETION WERE FILLED IN.     Signature of Patient:   ___________________________    When the patient is a minor or mentally incompetent to give consent:  Signature of person authorized to consent for patient: ____________ drugs/illegal medications). Failure to inform my anesthesiologist about these medicines may increase my risk of anesthetic complications. · If I am allergic to anything or have had a reaction to anesthesia before.     3. I understand how the anesthesia med I have discussed the procedure and information above with the patient (or patient’s representative) and answered their questions. The patient or their representative has agreed to have anesthesia services.     _______________________________________________